# Patient Record
Sex: MALE | Race: WHITE | NOT HISPANIC OR LATINO | Employment: OTHER | URBAN - METROPOLITAN AREA
[De-identification: names, ages, dates, MRNs, and addresses within clinical notes are randomized per-mention and may not be internally consistent; named-entity substitution may affect disease eponyms.]

---

## 2023-04-04 LAB
ALANINE AMINOTRANSFERASE (SGPT) (U/L) IN SER/PLAS: 28 U/L (ref 10–52)
ALBUMIN (G/DL) IN SER/PLAS: 4.2 G/DL (ref 3.4–5)
ALKALINE PHOSPHATASE (U/L) IN SER/PLAS: 90 U/L (ref 33–136)
ANION GAP IN SER/PLAS: 15 MMOL/L (ref 10–20)
ASPARTATE AMINOTRANSFERASE (SGOT) (U/L) IN SER/PLAS: 26 U/L (ref 9–39)
BILIRUBIN TOTAL (MG/DL) IN SER/PLAS: 1.1 MG/DL (ref 0–1.2)
CALCIUM (MG/DL) IN SER/PLAS: 9.7 MG/DL (ref 8.6–10.6)
CARBON DIOXIDE, TOTAL (MMOL/L) IN SER/PLAS: 27 MMOL/L (ref 21–32)
CHLORIDE (MMOL/L) IN SER/PLAS: 105 MMOL/L (ref 98–107)
CHOLESTEROL (MG/DL) IN SER/PLAS: 133 MG/DL (ref 0–199)
CHOLESTEROL IN HDL (MG/DL) IN SER/PLAS: 51.9 MG/DL
CHOLESTEROL/HDL RATIO: 2.6
CREATININE (MG/DL) IN SER/PLAS: 1.14 MG/DL (ref 0.5–1.3)
ERYTHROCYTE DISTRIBUTION WIDTH (RATIO) BY AUTOMATED COUNT: 14.8 % (ref 11.5–14.5)
ERYTHROCYTE MEAN CORPUSCULAR HEMOGLOBIN CONCENTRATION (G/DL) BY AUTOMATED: 31.3 G/DL (ref 32–36)
ERYTHROCYTE MEAN CORPUSCULAR VOLUME (FL) BY AUTOMATED COUNT: 85 FL (ref 80–100)
ERYTHROCYTES (10*6/UL) IN BLOOD BY AUTOMATED COUNT: 4.94 X10E12/L (ref 4.5–5.9)
GFR MALE: 65 ML/MIN/1.73M2
GLUCOSE (MG/DL) IN SER/PLAS: 76 MG/DL (ref 74–99)
HEMATOCRIT (%) IN BLOOD BY AUTOMATED COUNT: 41.9 % (ref 41–52)
HEMOGLOBIN (G/DL) IN BLOOD: 13.1 G/DL (ref 13.5–17.5)
LDL: 68 MG/DL (ref 0–99)
LEUKOCYTES (10*3/UL) IN BLOOD BY AUTOMATED COUNT: 11.4 X10E9/L (ref 4.4–11.3)
NRBC (PER 100 WBCS) BY AUTOMATED COUNT: 0 /100 WBC (ref 0–0)
PLATELETS (10*3/UL) IN BLOOD AUTOMATED COUNT: 317 X10E9/L (ref 150–450)
POTASSIUM (MMOL/L) IN SER/PLAS: 4.9 MMOL/L (ref 3.5–5.3)
PROSTATE SPECIFIC AG (NG/ML) IN SER/PLAS: 2.24 NG/ML (ref 0–4)
PROTEIN TOTAL: 7.1 G/DL (ref 6.4–8.2)
SODIUM (MMOL/L) IN SER/PLAS: 142 MMOL/L (ref 136–145)
THYROTROPIN (MIU/L) IN SER/PLAS BY DETECTION LIMIT <= 0.05 MIU/L: 0.91 MIU/L (ref 0.44–3.98)
TRIGLYCERIDE (MG/DL) IN SER/PLAS: 66 MG/DL (ref 0–149)
UREA NITROGEN (MG/DL) IN SER/PLAS: 24 MG/DL (ref 6–23)
VLDL: 13 MG/DL (ref 0–40)

## 2023-08-21 PROBLEM — I48.91 ATRIAL FIBRILLATION (MULTI): Status: ACTIVE | Noted: 2023-08-21

## 2023-08-21 PROBLEM — J31.0 CHRONIC RHINITIS: Status: ACTIVE | Noted: 2023-08-21

## 2023-08-21 PROBLEM — F32.A DEPRESSION: Status: ACTIVE | Noted: 2023-08-21

## 2023-08-21 PROBLEM — N32.3 BLADDER DIVERTICULUM: Status: ACTIVE | Noted: 2023-08-21

## 2023-08-21 PROBLEM — R97.20 BPH WITH ELEVATED PSA: Status: ACTIVE | Noted: 2023-08-21

## 2023-08-21 PROBLEM — E55.9 VITAMIN D DEFICIENCY: Status: ACTIVE | Noted: 2023-08-21

## 2023-08-21 PROBLEM — R97.20 ELEVATED PSA: Status: ACTIVE | Noted: 2023-08-21

## 2023-08-21 PROBLEM — R41.3 MEMORY CHANGES: Status: ACTIVE | Noted: 2023-08-21

## 2023-08-21 PROBLEM — E78.5 HYPERLIPIDEMIA: Status: ACTIVE | Noted: 2023-08-21

## 2023-08-21 PROBLEM — R49.0 HOARSENESS: Status: ACTIVE | Noted: 2023-08-21

## 2023-08-21 PROBLEM — R53.83 FATIGUE: Status: ACTIVE | Noted: 2023-08-21

## 2023-08-21 PROBLEM — N39.0 UTI (URINARY TRACT INFECTION): Status: ACTIVE | Noted: 2023-08-21

## 2023-08-21 PROBLEM — I25.10 CAD (CORONARY ARTERY DISEASE): Status: ACTIVE | Noted: 2023-08-21

## 2023-08-21 PROBLEM — H90.3 SENSORINEURAL HEARING LOSS (SNHL) OF BOTH EARS: Status: ACTIVE | Noted: 2023-08-21

## 2023-08-21 PROBLEM — J34.2 DEVIATED NASAL SEPTUM: Status: ACTIVE | Noted: 2023-08-21

## 2023-08-21 PROBLEM — R63.4 ABNORMAL WEIGHT LOSS: Status: ACTIVE | Noted: 2023-08-21

## 2023-08-21 PROBLEM — H91.90 HEARING LOSS: Status: ACTIVE | Noted: 2023-08-21

## 2023-08-21 PROBLEM — T50.905A MEDICATION INDUCED COAGULOPATHY (MULTI): Status: ACTIVE | Noted: 2023-08-21

## 2023-08-21 PROBLEM — I10 HYPERTENSION: Status: ACTIVE | Noted: 2023-08-21

## 2023-08-21 PROBLEM — H57.9 EYE DISORDER: Status: ACTIVE | Noted: 2023-08-21

## 2023-08-21 PROBLEM — K21.9 ACID REFLUX: Status: ACTIVE | Noted: 2023-08-21

## 2023-08-21 PROBLEM — R04.0 EPISTAXIS: Status: ACTIVE | Noted: 2023-08-21

## 2023-08-21 PROBLEM — D68.9 MEDICATION INDUCED COAGULOPATHY (MULTI): Status: ACTIVE | Noted: 2023-08-21

## 2023-08-21 PROBLEM — N40.0 BPH WITH ELEVATED PSA: Status: ACTIVE | Noted: 2023-08-21

## 2023-08-21 RX ORDER — DORZOLAMIDE HYDROCHLORIDE AND TIMOLOL MALEATE 20; 5 MG/ML; MG/ML
1 SOLUTION/ DROPS OPHTHALMIC 4 TIMES DAILY
COMMUNITY
Start: 2023-04-03

## 2023-08-21 RX ORDER — LISINOPRIL 10 MG/1
1 TABLET ORAL DAILY
COMMUNITY
Start: 2015-05-26 | End: 2024-01-12

## 2023-08-21 RX ORDER — WARFARIN 3 MG/1
1 TABLET ORAL DAILY
COMMUNITY
Start: 2021-02-16 | End: 2023-11-06

## 2023-08-21 RX ORDER — ASPIRIN 81 MG/1
1 TABLET ORAL DAILY
COMMUNITY
Start: 2014-06-13 | End: 2024-02-16 | Stop reason: SDUPTHER

## 2023-08-21 RX ORDER — EZETIMIBE 10 MG/1
1 TABLET ORAL DAILY
COMMUNITY
Start: 2019-11-20 | End: 2024-02-15 | Stop reason: SDUPTHER

## 2023-08-21 RX ORDER — METOPROLOL SUCCINATE 25 MG/1
1 TABLET, EXTENDED RELEASE ORAL DAILY
COMMUNITY
Start: 2015-05-26 | End: 2024-02-15 | Stop reason: SDUPTHER

## 2023-08-21 RX ORDER — OFLOXACIN 3 MG/ML
SOLUTION/ DROPS OPHTHALMIC
COMMUNITY
Start: 2016-04-01 | End: 2023-12-29

## 2023-08-21 RX ORDER — PANTOPRAZOLE SODIUM 20 MG/1
1 TABLET, DELAYED RELEASE ORAL DAILY
COMMUNITY
Start: 2014-07-14 | End: 2024-01-05 | Stop reason: ALTCHOICE

## 2023-08-21 RX ORDER — ATORVASTATIN CALCIUM 80 MG/1
1 TABLET, FILM COATED ORAL DAILY
COMMUNITY
Start: 2018-02-19 | End: 2024-02-15 | Stop reason: SDUPTHER

## 2023-09-28 DIAGNOSIS — Z79.01 LONG TERM (CURRENT) USE OF ANTICOAGULANTS: ICD-10-CM

## 2023-09-28 DIAGNOSIS — I48.91 ATRIAL FIBRILLATION, UNSPECIFIED TYPE (MULTI): Primary | ICD-10-CM

## 2023-09-28 LAB
INR IN PPP BY COAGULATION ASSAY EXTERNAL: 2.6
PROTHROMBIN TIME (PT) IN PPP BY COAGULATION ASSAY EXTERNAL: NORMAL SECONDS

## 2023-10-02 ENCOUNTER — APPOINTMENT (OUTPATIENT)
Dept: CARDIOLOGY | Facility: CLINIC | Age: 83
End: 2023-10-02
Payer: MEDICARE

## 2023-10-17 PROBLEM — Z79.01 LONG TERM (CURRENT) USE OF ANTICOAGULANTS: Status: ACTIVE | Noted: 2023-10-17

## 2023-10-26 ENCOUNTER — ANTICOAGULATION - WARFARIN VISIT (OUTPATIENT)
Dept: CARDIOLOGY | Facility: CLINIC | Age: 83
End: 2023-10-26
Payer: MEDICARE

## 2023-10-26 DIAGNOSIS — I48.91 ATRIAL FIBRILLATION, UNSPECIFIED TYPE (MULTI): ICD-10-CM

## 2023-10-26 DIAGNOSIS — Z79.01 LONG TERM (CURRENT) USE OF ANTICOAGULANTS: ICD-10-CM

## 2023-10-26 LAB
POC INR: 3.4
POC PROTHROMBIN TIME: NORMAL

## 2023-10-26 PROCEDURE — 99211 OFF/OP EST MAY X REQ PHY/QHP: CPT | Performed by: INTERNAL MEDICINE

## 2023-10-26 PROCEDURE — 85610 PROTHROMBIN TIME: CPT | Mod: QW

## 2023-10-26 NOTE — PROGRESS NOTES
Patient identification verified with 2 identifiers.    Location: [facility] (Building [address]) - [address] [address] [place] [address] [phone]     Referring Physician: Nick  Enrollment/ Re-enrollment date: 09/2024   INR Goal: 2.0-3.0  INR monitoring is per [facility] protocol.  Anticoagulation Medication: warfarin  Indication: Atrial Fibrillation/Atrial Flutter    Subjective   Bleeding signs/symptoms: No    Bruising: No   Major bleeding event: No  Thrombosis signs/symptoms: No  Thromboembolic event: No  Missed doses: No  Extra doses: No  Medication changes: No  Dietary changes: Yes Decrease in Vitamin K  Change in health: No  Change in activity: No  Alcohol: No  Other concerns: No    Upcoming Surgeries:  Does the Patient Have any upcoming surgeries that require interruption in anticoagulation therapy? no  Does the patient require bridging? no      Anticoagulation Summary  As of 10/26/2023      INR goal:  2.0-3.0   TTR:  24.3 % (2.6 wk)   INR used for dosing:  3.40 (10/26/2023)   Weekly warfarin total:  21 mg               Assessment/Plan   Supratherapeutic     1. New dose: no change    2. Next INR: 1 week      Education provided to patient during the visit:  Patient instructed to call in interim with questions, concerns and changes.   Patient educated on dietary consistency in vitamin k consumption.

## 2023-11-02 ENCOUNTER — APPOINTMENT (OUTPATIENT)
Dept: CARDIOLOGY | Facility: CLINIC | Age: 83
End: 2023-11-02
Payer: MEDICARE

## 2023-11-05 DIAGNOSIS — I48.91 ATRIAL FIBRILLATION, UNSPECIFIED TYPE (MULTI): Primary | ICD-10-CM

## 2023-11-06 RX ORDER — WARFARIN 3 MG/1
3 TABLET ORAL
Qty: 100 TABLET | Refills: 2 | Status: SHIPPED | OUTPATIENT
Start: 2023-11-06 | End: 2024-02-15 | Stop reason: SDUPTHER

## 2023-11-08 ENCOUNTER — ANTICOAGULATION - WARFARIN VISIT (OUTPATIENT)
Dept: CARDIOLOGY | Facility: CLINIC | Age: 83
End: 2023-11-08
Payer: MEDICARE

## 2023-11-08 DIAGNOSIS — I48.91 ATRIAL FIBRILLATION, UNSPECIFIED TYPE (MULTI): ICD-10-CM

## 2023-11-08 DIAGNOSIS — Z79.01 LONG TERM (CURRENT) USE OF ANTICOAGULANTS: ICD-10-CM

## 2023-11-09 ENCOUNTER — ANTICOAGULATION - WARFARIN VISIT (OUTPATIENT)
Dept: CARDIOLOGY | Facility: CLINIC | Age: 83
End: 2023-11-09
Payer: MEDICARE

## 2023-11-09 ENCOUNTER — APPOINTMENT (OUTPATIENT)
Dept: CARDIOLOGY | Facility: CLINIC | Age: 83
End: 2023-11-09
Payer: MEDICARE

## 2023-11-09 ENCOUNTER — TELEPHONE (OUTPATIENT)
Dept: CARDIOLOGY | Facility: CLINIC | Age: 83
End: 2023-11-09
Payer: MEDICARE

## 2023-11-09 DIAGNOSIS — Z79.01 LONG TERM (CURRENT) USE OF ANTICOAGULANTS: ICD-10-CM

## 2023-11-09 DIAGNOSIS — I48.91 ATRIAL FIBRILLATION, UNSPECIFIED TYPE (MULTI): ICD-10-CM

## 2023-11-15 ENCOUNTER — TELEPHONE (OUTPATIENT)
Dept: CARDIOLOGY | Facility: CLINIC | Age: 83
End: 2023-11-15
Payer: MEDICARE

## 2023-11-15 NOTE — TELEPHONE ENCOUNTER
PATIENT DID NOT SHOW FOR APT, ATTEMPTED TO CALL TO RESCHEDULE BUT #'S ARE DISCONNECTED OR INVALID.

## 2023-12-04 ENCOUNTER — TELEPHONE (OUTPATIENT)
Dept: PRIMARY CARE | Facility: CLINIC | Age: 83
End: 2023-12-04
Payer: MEDICARE

## 2023-12-07 ENCOUNTER — ANTICOAGULATION - WARFARIN VISIT (OUTPATIENT)
Dept: CARDIOLOGY | Facility: CLINIC | Age: 83
End: 2023-12-07
Payer: MEDICARE

## 2023-12-07 DIAGNOSIS — Z79.01 LONG TERM (CURRENT) USE OF ANTICOAGULANTS: ICD-10-CM

## 2023-12-07 DIAGNOSIS — I48.91 ATRIAL FIBRILLATION, UNSPECIFIED TYPE (MULTI): ICD-10-CM

## 2023-12-07 LAB
POC INR: 2.3
POC PROTHROMBIN TIME: NORMAL

## 2023-12-07 PROCEDURE — 85610 PROTHROMBIN TIME: CPT | Mod: QW

## 2023-12-07 PROCEDURE — 99211 OFF/OP EST MAY X REQ PHY/QHP: CPT | Mod: 27 | Performed by: INTERNAL MEDICINE

## 2023-12-07 NOTE — PROGRESS NOTES
Patient identification verified with 2 identifiers.    Location: [facility] ([address]) - [address] [place] [address] [phone]     Referring Physician: Dr. Sae Romero  Enrollment/ Re-enrollment date: 2024   INR Goal: 2.0-3.0  INR monitoring is per [facility] protocol.  Anticoagulation Medication: warfarin  Indication: Atrial Fibrillation/Atrial Flutter    Subjective   Bleeding signs/symptoms: No    Bruising: No   Major bleeding event: No  Thrombosis signs/symptoms: No  Thromboembolic event: No  Missed doses: No  Extra doses: No  Medication changes: No  Dietary changes: Yes Decrease in Vitamin K  Change in health: Yes  Change in activity: No  Alcohol: No  Other concerns: No    Upcoming Surgeries:  Does the Patient Have any upcoming surgeries that require interruption in anticoagulation therapy? no  Does the patient require bridging? no      Anticoagulation Summary  As of 2023      INR goal:  2.0-3.0   TTR:  51.6 % (2 mo)   INR used for dosin.30 (2023)   Weekly warfarin total:  21 mg               Assessment/Plan   Therapeutic    1. New dose:   no change    2. Next INR: 1 month      Education provided to patient during the visit:  Patient instructed to call in interim with questions, concerns and changes.   Patient educated on dietary consistency in vitamin k consumption.

## 2023-12-22 ENCOUNTER — HOSPITAL ENCOUNTER (OUTPATIENT)
Dept: CARDIOLOGY | Facility: CLINIC | Age: 83
Discharge: HOME | End: 2023-12-22
Payer: MEDICARE

## 2023-12-22 VITALS
DIASTOLIC BLOOD PRESSURE: 77 MMHG | HEIGHT: 65 IN | SYSTOLIC BLOOD PRESSURE: 134 MMHG | BODY MASS INDEX: 20.66 KG/M2 | WEIGHT: 124 LBS

## 2023-12-22 DIAGNOSIS — I25.10 CAD (CORONARY ARTERY DISEASE): ICD-10-CM

## 2023-12-22 DIAGNOSIS — I48.91 A-FIB (MULTI): ICD-10-CM

## 2023-12-22 PROCEDURE — 93306 TTE W/DOPPLER COMPLETE: CPT

## 2023-12-22 PROCEDURE — 93306 TTE W/DOPPLER COMPLETE: CPT | Performed by: INTERNAL MEDICINE

## 2023-12-26 LAB
AORTIC VALVE MEAN GRADIENT: 14.6
AORTIC VALVE PEAK VELOCITY: 2.89
AV PEAK GRADIENT: 33.3
AVA (PEAK VEL): 1.45
AVA (VTI): 1.73
EJECTION FRACTION APICAL 4 CHAMBER: 62.5
EJECTION FRACTION: 68
LEFT ATRIUM VOLUME AREA LENGTH INDEX BSA: 34.5
LEFT VENTRICLE INTERNAL DIMENSION DIASTOLE: 3.36 (ref 3.5–6)
LEFT VENTRICULAR OUTFLOW TRACT DIAMETER: 2.23
MITRAL VALVE E/A RATIO: 1.04
MITRAL VALVE E/E' RATIO: 10.61
RIGHT VENTRICLE FREE WALL PEAK S': 11.04
RIGHT VENTRICLE PEAK SYSTOLIC PRESSURE: 36.7
TRICUSPID ANNULAR PLANE SYSTOLIC EXCURSION: 2.4

## 2023-12-29 ENCOUNTER — OFFICE VISIT (OUTPATIENT)
Dept: CARDIOLOGY | Facility: CLINIC | Age: 83
End: 2023-12-29
Payer: MEDICARE

## 2023-12-29 VITALS
OXYGEN SATURATION: 97 % | SYSTOLIC BLOOD PRESSURE: 136 MMHG | WEIGHT: 121.6 LBS | HEART RATE: 53 BPM | DIASTOLIC BLOOD PRESSURE: 79 MMHG | BODY MASS INDEX: 18.01 KG/M2 | HEIGHT: 69 IN

## 2023-12-29 DIAGNOSIS — I25.10 CORONARY ARTERY DISEASE INVOLVING NATIVE CORONARY ARTERY OF NATIVE HEART WITHOUT ANGINA PECTORIS: Primary | ICD-10-CM

## 2023-12-29 PROBLEM — N40.0 BENIGN PROSTATIC HYPERPLASIA: Status: ACTIVE | Noted: 2023-04-03

## 2023-12-29 PROBLEM — R31.0 GROSS HEMATURIA: Status: ACTIVE | Noted: 2023-12-29

## 2023-12-29 PROBLEM — Z86.79 HISTORY OF HYPERTENSION: Status: ACTIVE | Noted: 2023-12-29

## 2023-12-29 PROCEDURE — 99214 OFFICE O/P EST MOD 30 MIN: CPT | Performed by: NURSE PRACTITIONER

## 2023-12-29 PROCEDURE — 3078F DIAST BP <80 MM HG: CPT | Performed by: NURSE PRACTITIONER

## 2023-12-29 PROCEDURE — 1160F RVW MEDS BY RX/DR IN RCRD: CPT | Performed by: NURSE PRACTITIONER

## 2023-12-29 PROCEDURE — 1126F AMNT PAIN NOTED NONE PRSNT: CPT | Performed by: NURSE PRACTITIONER

## 2023-12-29 PROCEDURE — 3075F SYST BP GE 130 - 139MM HG: CPT | Performed by: NURSE PRACTITIONER

## 2023-12-29 PROCEDURE — 1036F TOBACCO NON-USER: CPT | Performed by: NURSE PRACTITIONER

## 2023-12-29 PROCEDURE — 1159F MED LIST DOCD IN RCRD: CPT | Performed by: NURSE PRACTITIONER

## 2023-12-29 RX ORDER — MIRTAZAPINE 15 MG/1
TABLET, FILM COATED ORAL
COMMUNITY
Start: 2020-01-14 | End: 2023-12-29 | Stop reason: WASHOUT

## 2023-12-29 RX ORDER — PREDNISOLONE ACETATE 10 MG/ML
SUSPENSION/ DROPS OPHTHALMIC
COMMUNITY
Start: 2016-04-01 | End: 2023-12-29 | Stop reason: WASHOUT

## 2023-12-29 ASSESSMENT — ENCOUNTER SYMPTOMS
LOSS OF SENSATION IN FEET: 0
MUSCULOSKELETAL NEGATIVE: 1
GASTROINTESTINAL NEGATIVE: 1
CONSTITUTIONAL NEGATIVE: 1
RESPIRATORY NEGATIVE: 1
DEPRESSION: 0
CARDIOVASCULAR NEGATIVE: 1
OCCASIONAL FEELINGS OF UNSTEADINESS: 0
NEUROLOGICAL NEGATIVE: 1

## 2023-12-29 ASSESSMENT — PAIN SCALES - GENERAL: PAINLEVEL: 0-NO PAIN

## 2023-12-29 NOTE — PROGRESS NOTES
"Chief Complaint:   Follow up    History Of Present Illness:    .Mr Farley returns in follow up.  Denies chest pain, sob, palpitations or pedal edema.           Last Recorded Vitals:  Blood pressure 136/79, pulse 53, height 1.753 m (5' 9\"), weight 55.2 kg (121 lb 9.6 oz), SpO2 97 %.     Past Medical History:  Past Medical History:   Diagnosis Date    Atherosclerotic heart disease of native coronary artery with unspecified angina pectoris (CMS/HCC)     Coronary artery disease involving native heart with angina pectoris, unspecified vessel or lesion type    Personal history of other diseases of the circulatory system     History of hypertension        Past Surgical History:  Past Surgical History:   Procedure Laterality Date    OTHER SURGICAL HISTORY  04/07/2015    Cath Stent 1 Location       Social History:  Social History     Socioeconomic History    Marital status:      Spouse name: None    Number of children: None    Years of education: None    Highest education level: None   Occupational History    None   Tobacco Use    Smoking status: Never    Smokeless tobacco: Never   Substance and Sexual Activity    Alcohol use: Never    Drug use: Never    Sexual activity: None   Other Topics Concern    None   Social History Narrative    None     Social Determinants of Health     Financial Resource Strain: Not on file   Food Insecurity: Not on file   Transportation Needs: Not on file   Physical Activity: Not on file   Stress: Not on file   Social Connections: Not on file   Intimate Partner Violence: Not on file   Housing Stability: Not on file       Family History:  Family History   Problem Relation Name Age of Onset    Hypertension Mother      Aneurysm Father           Allergies:  Patient has no known allergies.    Outpatient Medications:  Current Outpatient Medications   Medication Sig Dispense Refill    aspirin 81 mg EC tablet Take 1 tablet (81 mg) by mouth once daily.      atorvastatin (Lipitor) 80 mg tablet Take 1 " tablet (80 mg) by mouth once daily.      dorzolamide-timoloL (Cosopt) 22.3-6.8 mg/mL ophthalmic solution Administer 1 drop into affected eye(s) 4 times a day.      ezetimibe (Zetia) 10 mg tablet Take 1 tablet (10 mg) by mouth once daily.      lisinopril 10 mg tablet Take 1 tablet (10 mg) by mouth once daily.      metoprolol succinate XL (Toprol-XL) 25 mg 24 hr tablet Take 1 tablet (25 mg) by mouth once daily.      pantoprazole (ProtoNix) 20 mg EC tablet Take 1 tablet (20 mg) by mouth once daily.      warfarin (Coumadin) 3 mg tablet TAKE 1 TABLET BY MOUTH DAILY 100 tablet 2     No current facility-administered medications for this visit.        Physical Exam:  Cardiovascular:      PMI at left midclavicular line. Normal rate. Regular rhythm. Normal S1. Normal S2.       Murmurs: There is no murmur.      No gallop.  No click. No rub.   Pulses:     Intact distal pulses.   Edema:     Peripheral edema absent.         ROS:  Review of Systems   Constitutional: Negative.   Cardiovascular: Negative.    Respiratory: Negative.     Skin: Negative.    Musculoskeletal: Negative.    Gastrointestinal: Negative.    Genitourinary: Negative.    Neurological: Negative.           Last Labs:  CBC -  Lab Results   Component Value Date    WBC 11.4 (H) 04/03/2023    HGB 13.1 (L) 04/03/2023    HCT 41.9 04/03/2023    MCV 85 04/03/2023     04/03/2023       CMP -  Lab Results   Component Value Date    CALCIUM 9.7 04/03/2023    PHOS 3.0 01/23/2020    PROT 7.1 04/03/2023    ALBUMIN 4.2 04/03/2023    AST 26 04/03/2023    ALT 28 04/03/2023    ALKPHOS 90 04/03/2023    BILITOT 1.1 04/03/2023       LIPID PANEL -   Lab Results   Component Value Date    CHOL 133 04/03/2023    TRIG 66 04/03/2023    HDL 51.9 04/03/2023    CHHDL 2.6 04/03/2023    LDLF 68 04/03/2023    VLDL 13 04/03/2023       RENAL FUNCTION PANEL -   Lab Results   Component Value Date    GLUCOSE 76 04/03/2023     04/03/2023    K 4.9 04/03/2023     04/03/2023    CO2 27  04/03/2023    ANIONGAP 15 04/03/2023    BUN 24 (H) 04/03/2023    CREATININE 1.14 04/03/2023    GFRMALE 65 04/03/2023    CALCIUM 9.7 04/03/2023    PHOS 3.0 01/23/2020    ALBUMIN 4.2 04/03/2023        Lab Results   Component Value Date    HGBA1C 5.3 11/18/2019         Assessment/Plan   Problem List Items Addressed This Visit    None    Impressions     assessment:     1. Coronary artery disease with percutaneous coronary intervention, and stent to high-grade left anterior descending stenosis December 7, 2004. He did have a stress echo on February 2005, which was negative for ischemia. The patient more recently had a surveillance nuclear exercise treadmill test performed on 6/19/2015. EKG tracings were concerning for the presence of ischemia with downsloping ST segment depression in the inferolateral and anterior leads. The nuclear perfusion scan was surprisingly negative for any evidence of ischemia. Based on EKG findings however cardiac catheterization was advised and performed at [facility] on 6/23/2015. Demonstrated a moderate segmental 50% tubular narrowing of the late proximal LAD with a widely patent stent within the midportion of the LAD. The large essentially dominant LCx was free of obstructive disease. The small to medium-sized RCA exhibited a complete 100% mid vessel occlusion with minimal retrograde filling of the distal vessel from LAD septal perforators. The left ventriculogram showed preserved LV function. The study was reviewed by interventional cardiology and they PCI was not performed given the absence of an anatomically critical lesion, the absence of any anginal symptoms, and the absence of a perfusion abnormality on the nuclear perfusion scan of the stress test. He will however be monitored closely for disease progression in the LAD that might necessitate another PCI. Patient denies any chest pain at today's office visit.    Echo 12/2023  CONCLUSIONS:   1. Left ventricular systolic  function is normal with a 60-65% estimated ejection fraction.   2. Trace mitral valve regurgitation.   3. Slightly elevated RVSP.   4. Moderate aortic valve stenosis.   5. The aortic valve appears tricuspid with restriction.   6. There is moderate sclerotic calcification and reduced mobility of the right and noncoronary aortic valve cusps.   7. The estimated PASP is 37 mmHg.   8. The peak instantaneous gradient of the aortic valve is 33.3 mmHg.    2. Chronic atrial fibrillation. Patient had a zio patch on 5/20/2016 for one week this showed the predominant rhythm was a sinus rhythm with a heart rate of  bpm with an average heart rate of 70 bpm. He had one episode of ventricular tachycardia lasting five beats. Me was restarted on Metoprolol ER 25 mg daily as well as the digoxin 0.125 mg daily. EKG today to 19 2018 again confirms reversion back to atrial fibrillation which is asymptomatic and chronic. The ventricular rate is adequately controlled with low-dose metoprolol ER 25 mg daily which will be kept unchanged. The patient is asymptomatic without any palpitations although he is unsure as to whether or not he is aware of them even at the time of atrial fibrillation.   3. Coumadin anticoagulation. The patient will continue following up with the [facility]. We did discuss alternate anticoagulation at today's office visit.   4. Hyperlipidemia. The patient did have a lipid panel performed on 10/05/2016 including cholesterol 144 LDL 77 HDL 49 triglyceride 86. In order to improve his results she will be switched from simvastatin 80 mg daily to atorvastatin 80 mg daily.  5. Hypertension. Blood pressure is well-controlled at today's office visit. The Metoprolol ER 25 mg daily and Lisinopril 10 mg will be continued.      Edna Araujo, APRN-CNP

## 2024-01-04 ENCOUNTER — ANTICOAGULATION - WARFARIN VISIT (OUTPATIENT)
Dept: CARDIOLOGY | Facility: CLINIC | Age: 84
End: 2024-01-04
Payer: MEDICARE

## 2024-01-04 DIAGNOSIS — I48.91 ATRIAL FIBRILLATION, UNSPECIFIED TYPE (MULTI): ICD-10-CM

## 2024-01-04 DIAGNOSIS — Z79.01 LONG TERM (CURRENT) USE OF ANTICOAGULANTS: ICD-10-CM

## 2024-01-04 LAB
POC INR: 2
POC PROTHROMBIN TIME: NORMAL

## 2024-01-04 PROCEDURE — 99211 OFF/OP EST MAY X REQ PHY/QHP: CPT | Performed by: INTERNAL MEDICINE

## 2024-01-04 PROCEDURE — 85610 PROTHROMBIN TIME: CPT | Mod: QW

## 2024-01-04 NOTE — PROGRESS NOTES
Patient identification verified with 2 identifiers.    Location: [facility] (Building [address]) - [address] [address] [place] [address] [phone]     Referring Physician: Dr. Sae Romero  Enrollment/ Re-enrollment date: 2024   INR Goal: 2.0-3.0  INR monitoring is per [facility] protocol.  Anticoagulation Medication: warfarin  Indication: Atrial Fibrillation/Atrial Flutter    Subjective   Bleeding signs/symptoms: No    Bruising: No   Major bleeding event: No  Thrombosis signs/symptoms: No  Thromboembolic event: No  Missed doses: No  Extra doses: No  Medication changes: No  Dietary changes: Yes Decrease in Vitamin K  Change in health: Yes  Change in activity: No  Alcohol: No  Other concerns: No    Upcoming Surgeries:  Does the Patient Have any upcoming surgeries that require interruption in anticoagulation therapy? no  Does the patient require bridging? no      Anticoagulation Summary  As of 2024      INR goal:  2.0-3.0   TTR:  66.9 % (2.9 mo)   INR used for dosin.00 (2024)   Weekly warfarin total:  21 mg               Assessment/Plan   Therapeutic    1. New dose:   no change    2. Next INR: 1 month      Education provided to patient during the visit:  Patient instructed to call in interim with questions, concerns and changes.   Patient educated on dietary consistency in vitamin k consumption.

## 2024-01-05 ENCOUNTER — OFFICE VISIT (OUTPATIENT)
Dept: PRIMARY CARE | Facility: CLINIC | Age: 84
End: 2024-01-05
Payer: MEDICARE

## 2024-01-05 VITALS
RESPIRATION RATE: 16 BRPM | TEMPERATURE: 98.2 F | WEIGHT: 121 LBS | HEIGHT: 65 IN | SYSTOLIC BLOOD PRESSURE: 123 MMHG | BODY MASS INDEX: 20.16 KG/M2 | OXYGEN SATURATION: 97 % | DIASTOLIC BLOOD PRESSURE: 75 MMHG | HEART RATE: 64 BPM

## 2024-01-05 DIAGNOSIS — E55.9 VITAMIN D DEFICIENCY: ICD-10-CM

## 2024-01-05 DIAGNOSIS — H90.3 SENSORINEURAL HEARING LOSS (SNHL) OF BOTH EARS: ICD-10-CM

## 2024-01-05 DIAGNOSIS — R97.20 ELEVATED PSA: ICD-10-CM

## 2024-01-05 DIAGNOSIS — K21.9 GASTROESOPHAGEAL REFLUX DISEASE WITHOUT ESOPHAGITIS: ICD-10-CM

## 2024-01-05 DIAGNOSIS — R53.83 OTHER FATIGUE: ICD-10-CM

## 2024-01-05 DIAGNOSIS — Z00.00 MEDICARE ANNUAL WELLNESS VISIT, SUBSEQUENT: Primary | ICD-10-CM

## 2024-01-05 DIAGNOSIS — G47.33 OSA (OBSTRUCTIVE SLEEP APNEA): ICD-10-CM

## 2024-01-05 DIAGNOSIS — J31.0 CHRONIC RHINITIS: ICD-10-CM

## 2024-01-05 DIAGNOSIS — R49.0 HOARSENESS: ICD-10-CM

## 2024-01-05 DIAGNOSIS — I10 HYPERTENSION, UNSPECIFIED TYPE: ICD-10-CM

## 2024-01-05 DIAGNOSIS — I35.0 NONRHEUMATIC AORTIC VALVE STENOSIS: ICD-10-CM

## 2024-01-05 DIAGNOSIS — I48.91 ATRIAL FIBRILLATION, UNSPECIFIED TYPE (MULTI): ICD-10-CM

## 2024-01-05 PROBLEM — R04.0 EPISTAXIS: Status: RESOLVED | Noted: 2023-08-21 | Resolved: 2024-01-05

## 2024-01-05 PROBLEM — Z86.79 HISTORY OF HYPERTENSION: Status: RESOLVED | Noted: 2023-12-29 | Resolved: 2024-01-05

## 2024-01-05 PROBLEM — R31.0 GROSS HEMATURIA: Status: RESOLVED | Noted: 2023-12-29 | Resolved: 2024-01-05

## 2024-01-05 PROBLEM — T50.905A MEDICATION INDUCED COAGULOPATHY (MULTI): Status: RESOLVED | Noted: 2023-08-21 | Resolved: 2024-01-05

## 2024-01-05 PROBLEM — D68.9 MEDICATION INDUCED COAGULOPATHY (MULTI): Status: RESOLVED | Noted: 2023-08-21 | Resolved: 2024-01-05

## 2024-01-05 PROBLEM — N39.0 UTI (URINARY TRACT INFECTION): Status: RESOLVED | Noted: 2023-08-21 | Resolved: 2024-01-05

## 2024-01-05 PROBLEM — F32.A DEPRESSION: Status: RESOLVED | Noted: 2023-08-21 | Resolved: 2024-01-05

## 2024-01-05 PROBLEM — R63.4 ABNORMAL WEIGHT LOSS: Status: RESOLVED | Noted: 2023-08-21 | Resolved: 2024-01-05

## 2024-01-05 PROCEDURE — 3078F DIAST BP <80 MM HG: CPT | Performed by: PEDIATRICS

## 2024-01-05 PROCEDURE — 1159F MED LIST DOCD IN RCRD: CPT | Performed by: PEDIATRICS

## 2024-01-05 PROCEDURE — 3074F SYST BP LT 130 MM HG: CPT | Performed by: PEDIATRICS

## 2024-01-05 PROCEDURE — 1036F TOBACCO NON-USER: CPT | Performed by: PEDIATRICS

## 2024-01-05 PROCEDURE — 99212 OFFICE O/P EST SF 10 MIN: CPT | Performed by: PEDIATRICS

## 2024-01-05 PROCEDURE — 1126F AMNT PAIN NOTED NONE PRSNT: CPT | Performed by: PEDIATRICS

## 2024-01-05 PROCEDURE — 1160F RVW MEDS BY RX/DR IN RCRD: CPT | Performed by: PEDIATRICS

## 2024-01-05 PROCEDURE — G0439 PPPS, SUBSEQ VISIT: HCPCS | Performed by: PEDIATRICS

## 2024-01-05 PROCEDURE — 1170F FXNL STATUS ASSESSED: CPT | Performed by: PEDIATRICS

## 2024-01-05 RX ORDER — FAMOTIDINE 40 MG/1
40 TABLET, FILM COATED ORAL DAILY
Qty: 90 TABLET | Refills: 1 | Status: SHIPPED | OUTPATIENT
Start: 2024-01-05 | End: 2024-02-16 | Stop reason: SDUPTHER

## 2024-01-05 ASSESSMENT — ACTIVITIES OF DAILY LIVING (ADL)
DRESSING: INDEPENDENT
BATHING: INDEPENDENT
MANAGING_FINANCES: INDEPENDENT
TAKING_MEDICATION: INDEPENDENT
DOING_HOUSEWORK: INDEPENDENT
GROCERY_SHOPPING: NEEDS ASSISTANCE

## 2024-01-05 ASSESSMENT — PATIENT HEALTH QUESTIONNAIRE - PHQ9
SUM OF ALL RESPONSES TO PHQ9 QUESTIONS 1 AND 2: 0
2. FEELING DOWN, DEPRESSED OR HOPELESS: NOT AT ALL
1. LITTLE INTEREST OR PLEASURE IN DOING THINGS: NOT AT ALL

## 2024-01-05 ASSESSMENT — PAIN SCALES - GENERAL: PAINLEVEL: 0-NO PAIN

## 2024-01-05 NOTE — PROGRESS NOTES
"Subjective   Reason for Visit: Silas Farley is an 83 y.o. male here for a Medicare Wellness visit.          Reviewed all medications by prescribing practitioner or clinical pharmacist (such as prescriptions, OTCs, herbal therapies and supplements) and documented in the medical record.    HPI  Patient has not been for quite some time. Sugar this year was good at 76. Kidney and liver functions were normal; hematocrit ok; PSA normal; cholesterol 133  Patient Care Team:  Jackie Saldivar MD as PCP - General (Pediatrics)     Review of Systems    Objective   Vitals:  /75 (BP Location: Right arm, Patient Position: Sitting, BP Cuff Size: Small adult)   Pulse 64   Temp 36.8 °C (98.2 °F) (Temporal)   Resp 16   Ht 1.651 m (5' 5\")   Wt 54.9 kg (121 lb)   SpO2 97%   BMI 20.14 kg/m²       Physical Exam  Vitals reviewed.   Constitutional:       General: He is not in acute distress.  HENT:      Head: Normocephalic.      Right Ear: Tympanic membrane normal.      Left Ear: Tympanic membrane normal.      Nose: Nose normal.      Mouth/Throat:      Pharynx: Oropharynx is clear.   Cardiovascular:      Rate and Rhythm: Normal rate and regular rhythm.      Heart sounds: Normal heart sounds.      Comments: Grade 2/6 murmur aortic area  Pulmonary:      Breath sounds: Normal breath sounds.   Abdominal:      Palpations: Abdomen is soft.      Tenderness: There is no abdominal tenderness.   Musculoskeletal:         General: No tenderness.   Skin:     Findings: No rash.   Neurological:      General: No focal deficit present.      Mental Status: He is alert.   Psychiatric:         Mood and Affect: Mood normal.     MMSE 29/30    Assessment/Plan   Problem List Items Addressed This Visit       Gastroesophageal reflux disease    Current Assessment & Plan     Notices that if he misses Pantoprazole for a couple of days he feels heartburn. Willing to try Famotidine         Relevant Medications    famotidine (Pepcid) 40 mg tablet    Atrial " fibrillation (CMS/HCC)    Current Assessment & Plan     Saw Dr Romero; has chronic a fib; on anticoagulation and rate control         Chronic rhinitis    Current Assessment & Plan     Uses astelin as needed         RESOLVED: Elevated PSA    Current Assessment & Plan     Has not seen urology for 3 years.         Fatigue    Current Assessment & Plan     Has excessive daytime sleepiness         Hoarseness    Current Assessment & Plan     Was advised to use Mucinex and stay hydrated per ENT         Hypertension    Current Assessment & Plan     Doing well on lisinopril, metoprolol         Sensorineural hearing loss (SNHL) of both ears    Current Assessment & Plan     Was advised to get hearing test         Vitamin D deficiency    Current Assessment & Plan     Has not been taking vitamin d         CHELSEA (obstructive sleep apnea)    Relevant Orders    Positive Airway Pressure (PAP) Therapy    Aortic stenosis    Current Assessment & Plan     Moderate aortic stenosis per Echo          Other Visit Diagnoses       Medicare annual wellness visit, subsequent    -  Primary

## 2024-01-05 NOTE — PATIENT INSTRUCTIONS
Consider shingles vaccine, RSV, Covid and flu vaccines at pharmacy.  Call insurance company  to ask which company we should send CPAP order for your sleep apnea.  Return in 6 months

## 2024-01-05 NOTE — ASSESSMENT & PLAN NOTE
Notices that if he misses Pantoprazole for a couple of days he feels heartburn. Willing to try Famotidine

## 2024-01-11 DIAGNOSIS — I10 HYPERTENSION, UNSPECIFIED TYPE: Primary | ICD-10-CM

## 2024-01-12 RX ORDER — LISINOPRIL 10 MG/1
10 TABLET ORAL DAILY
Qty: 100 TABLET | Refills: 2 | Status: SHIPPED | OUTPATIENT
Start: 2024-01-12 | End: 2024-02-15 | Stop reason: SDUPTHER

## 2024-02-01 ENCOUNTER — ANTICOAGULATION - WARFARIN VISIT (OUTPATIENT)
Dept: CARDIOLOGY | Facility: CLINIC | Age: 84
End: 2024-02-01
Payer: MEDICARE

## 2024-02-01 DIAGNOSIS — Z79.01 LONG TERM (CURRENT) USE OF ANTICOAGULANTS: ICD-10-CM

## 2024-02-01 DIAGNOSIS — I48.91 ATRIAL FIBRILLATION, UNSPECIFIED TYPE (MULTI): ICD-10-CM

## 2024-02-01 LAB
POC INR: 1.6
POC PROTHROMBIN TIME: NORMAL

## 2024-02-01 PROCEDURE — 99211 OFF/OP EST MAY X REQ PHY/QHP: CPT | Performed by: INTERNAL MEDICINE

## 2024-02-01 PROCEDURE — 85610 PROTHROMBIN TIME: CPT | Mod: QW

## 2024-02-01 NOTE — PROGRESS NOTES
Patient identification verified with 2 identifiers.    Location: [facility] ([address]) - [address] [address] [place] [address] [phone]     Referring Physician: Dr. Sae Romero  Enrollment/ Re-enrollment date: 2024   INR Goal: 2.0-3.0  INR monitoring is per [facility] protocol.  Anticoagulation Medication: warfarin  Indication: Atrial Fibrillation/Atrial Flutter    Subjective   Bleeding signs/symptoms: No    Bruising: No   Major bleeding event: No  Thrombosis signs/symptoms: No  Thromboembolic event: No  Missed doses: Yes  Missed 2 doses  Extra doses: No  Medication changes: No  Dietary changes: No  Change in health: No  Change in activity: No  Alcohol: No  Other concerns: No    Upcoming Surgeries:  Does the Patient Have any upcoming surgeries that require interruption in anticoagulation therapy? no  Does the patient require bridging? no      Anticoagulation Summary  As of 2024      INR goal:  2.0-3.0   TTR:  50.8 % (3.9 mo)   INR used for dosin.60 (2024)   Weekly warfarin total:  24 mg               Assessment/Plan   SUBTherapeutic    1. New dose:  Increase dose  2. Next INR: 1 week      Education provided to patient during the visit:  Patient instructed to call in interim with questions, concerns and changes.   Patient educated on dietary consistency in vitamin k consumption.

## 2024-02-08 ENCOUNTER — ANTICOAGULATION - WARFARIN VISIT (OUTPATIENT)
Dept: CARDIOLOGY | Facility: CLINIC | Age: 84
End: 2024-02-08
Payer: MEDICARE

## 2024-02-08 DIAGNOSIS — I48.91 ATRIAL FIBRILLATION, UNSPECIFIED TYPE (MULTI): ICD-10-CM

## 2024-02-08 DIAGNOSIS — Z79.01 LONG TERM (CURRENT) USE OF ANTICOAGULANTS: ICD-10-CM

## 2024-02-08 LAB
POC INR: 2.2
POC PROTHROMBIN TIME: NORMAL

## 2024-02-08 PROCEDURE — 99211 OFF/OP EST MAY X REQ PHY/QHP: CPT | Performed by: INTERNAL MEDICINE

## 2024-02-08 PROCEDURE — 85610 PROTHROMBIN TIME: CPT | Mod: QW

## 2024-02-08 NOTE — PROGRESS NOTES
Patient identification verified with 2 identifiers.    Location: [facility] (Building [address]) - [address] [address] [place] [address] [phone]     Referring Physician: Dr. Sae Romero  Enrollment/ Re-enrollment date: 2024   INR Goal: 2.0-3.0  INR monitoring is per [facility] protocol.  Anticoagulation Medication: warfarin  Indication: Atrial Fibrillation/Atrial Flutter    Subjective   Bleeding signs/symptoms: No    Bruising: No   Major bleeding event: No  Thrombosis signs/symptoms: No  Thromboembolic event: No  Missed doses: No  Extra doses: No  Medication changes: No  Dietary changes: No  Change in health: No  Change in activity: No  Alcohol: No  Other concerns: No    Upcoming Surgeries:  Does the Patient Have any upcoming surgeries that require interruption in anticoagulation therapy? no  Does the patient require bridging? no      Anticoagulation Summary  As of 2024      INR goal:  2.0-3.0   TTR:  49.9 % (4.1 mo)   INR used for dosin.20 (2024)   Weekly warfarin total:  21 mg               Assessment/Plan   Therapeutic    1. New dose:  Maintain dose- Since taking previous therapeutic dose  2. Next INR: 2 weeks      Education provided to patient during the visit:  Patient instructed to call in interim with questions, concerns and changes.   Patient educated on dietary consistency in vitamin k consumption.

## 2024-02-15 DIAGNOSIS — I48.91 ATRIAL FIBRILLATION, UNSPECIFIED TYPE (MULTI): ICD-10-CM

## 2024-02-15 DIAGNOSIS — E78.5 HYPERLIPIDEMIA: Primary | ICD-10-CM

## 2024-02-15 DIAGNOSIS — I10 HYPERTENSION: ICD-10-CM

## 2024-02-16 ENCOUNTER — TELEPHONE (OUTPATIENT)
Dept: PRIMARY CARE | Facility: CLINIC | Age: 84
End: 2024-02-16
Payer: MEDICARE

## 2024-02-16 DIAGNOSIS — I10 HYPERTENSION: ICD-10-CM

## 2024-02-16 DIAGNOSIS — I48.91 ATRIAL FIBRILLATION, UNSPECIFIED TYPE (MULTI): ICD-10-CM

## 2024-02-16 DIAGNOSIS — K21.9 GASTROESOPHAGEAL REFLUX DISEASE WITHOUT ESOPHAGITIS: ICD-10-CM

## 2024-02-16 DIAGNOSIS — E78.5 HYPERLIPIDEMIA: ICD-10-CM

## 2024-02-16 RX ORDER — METOPROLOL SUCCINATE 25 MG/1
25 TABLET, EXTENDED RELEASE ORAL DAILY
Qty: 90 TABLET | Refills: 3 | Status: SHIPPED | OUTPATIENT
Start: 2024-02-16 | End: 2024-02-16 | Stop reason: SDUPTHER

## 2024-02-16 RX ORDER — EZETIMIBE 10 MG/1
10 TABLET ORAL DAILY
Qty: 90 TABLET | Refills: 3 | Status: SHIPPED | OUTPATIENT
Start: 2024-02-16 | End: 2025-02-15

## 2024-02-16 RX ORDER — LISINOPRIL 10 MG/1
10 TABLET ORAL DAILY
Qty: 90 TABLET | Refills: 3 | Status: SHIPPED | OUTPATIENT
Start: 2024-02-16 | End: 2024-02-16 | Stop reason: SDUPTHER

## 2024-02-16 RX ORDER — ATORVASTATIN CALCIUM 80 MG/1
80 TABLET, FILM COATED ORAL DAILY
Qty: 90 TABLET | Refills: 3 | Status: SHIPPED | OUTPATIENT
Start: 2024-02-16 | End: 2024-02-16 | Stop reason: SDUPTHER

## 2024-02-16 RX ORDER — LISINOPRIL 10 MG/1
10 TABLET ORAL DAILY
Qty: 90 TABLET | Refills: 3 | Status: SHIPPED | OUTPATIENT
Start: 2024-02-16 | End: 2025-02-15

## 2024-02-16 RX ORDER — ATORVASTATIN CALCIUM 80 MG/1
80 TABLET, FILM COATED ORAL DAILY
Qty: 90 TABLET | Refills: 3 | Status: SHIPPED | OUTPATIENT
Start: 2024-02-16 | End: 2025-02-15

## 2024-02-16 RX ORDER — WARFARIN 3 MG/1
3 TABLET ORAL SEE ADMIN INSTRUCTIONS
Qty: 100 TABLET | Refills: 3 | Status: SHIPPED | OUTPATIENT
Start: 2024-02-16 | End: 2024-02-26 | Stop reason: SDUPTHER

## 2024-02-16 RX ORDER — WARFARIN 3 MG/1
3 TABLET ORAL SEE ADMIN INSTRUCTIONS
Qty: 100 TABLET | Refills: 3 | Status: SHIPPED | OUTPATIENT
Start: 2024-02-16 | End: 2024-02-16 | Stop reason: SDUPTHER

## 2024-02-16 RX ORDER — METOPROLOL SUCCINATE 25 MG/1
25 TABLET, EXTENDED RELEASE ORAL DAILY
Qty: 90 TABLET | Refills: 3 | Status: SHIPPED | OUTPATIENT
Start: 2024-02-16 | End: 2025-02-15

## 2024-02-16 RX ORDER — EZETIMIBE 10 MG/1
10 TABLET ORAL DAILY
Qty: 90 TABLET | Refills: 3 | Status: SHIPPED | OUTPATIENT
Start: 2024-02-16 | End: 2024-02-16 | Stop reason: SDUPTHER

## 2024-02-16 RX ORDER — FAMOTIDINE 40 MG/1
40 TABLET, FILM COATED ORAL DAILY
Qty: 90 TABLET | Refills: 1 | Status: SHIPPED | OUTPATIENT
Start: 2024-02-16 | End: 2024-08-14

## 2024-02-16 RX ORDER — ASPIRIN 81 MG/1
81 TABLET ORAL DAILY
Qty: 90 TABLET | Refills: 3 | Status: SHIPPED | OUTPATIENT
Start: 2024-02-16

## 2024-02-16 NOTE — TELEPHONE ENCOUNTER
Dr. Janna Chase [facility] [employer] left a voicemail message saying  Silas Farley is new to [facility] and he would like all his medications sent to [facility] delivery mail order [phone]. Rena also said if you have questions to call Silas  at [phone].

## 2024-02-21 DIAGNOSIS — I48.91 ATRIAL FIBRILLATION, UNSPECIFIED TYPE (MULTI): ICD-10-CM

## 2024-02-22 RX ORDER — WARFARIN 3 MG/1
3 TABLET ORAL SEE ADMIN INSTRUCTIONS
Qty: 100 TABLET | Refills: 3 | OUTPATIENT
Start: 2024-02-22 | End: 2025-02-21

## 2024-02-26 ENCOUNTER — TELEPHONE (OUTPATIENT)
Dept: PRIMARY CARE | Facility: CLINIC | Age: 84
End: 2024-02-26
Payer: MEDICARE

## 2024-02-26 DIAGNOSIS — I48.91 ATRIAL FIBRILLATION, UNSPECIFIED TYPE (MULTI): ICD-10-CM

## 2024-02-26 RX ORDER — WARFARIN 3 MG/1
3 TABLET ORAL NIGHTLY
Qty: 100 TABLET | Refills: 3 | Status: SHIPPED | OUTPATIENT
Start: 2024-02-26 | End: 2025-02-25

## 2024-02-29 ENCOUNTER — ANTICOAGULATION - WARFARIN VISIT (OUTPATIENT)
Dept: CARDIOLOGY | Facility: CLINIC | Age: 84
End: 2024-02-29
Payer: MEDICARE

## 2024-02-29 DIAGNOSIS — Z79.01 LONG TERM (CURRENT) USE OF ANTICOAGULANTS: ICD-10-CM

## 2024-02-29 DIAGNOSIS — I48.91 ATRIAL FIBRILLATION, UNSPECIFIED TYPE (MULTI): ICD-10-CM

## 2024-02-29 LAB
POC INR: 2.8
POC PROTHROMBIN TIME: NORMAL

## 2024-02-29 PROCEDURE — 99211 OFF/OP EST MAY X REQ PHY/QHP: CPT | Performed by: INTERNAL MEDICINE

## 2024-02-29 PROCEDURE — 85610 PROTHROMBIN TIME: CPT | Mod: QW

## 2024-02-29 NOTE — PROGRESS NOTES
Patient identification verified with 2 identifiers.    Location: [facility] (Building [address]) - [address] [place] [address] [phone]     Referring Physician: Dr. Sae Romero  Enrollment/ Re-enrollment date: 2024   INR Goal: 2.0-3.0  INR monitoring is per [facility] protocol.  Anticoagulation Medication: warfarin  Indication: Atrial Fibrillation/Atrial Flutter    Subjective   Bleeding signs/symptoms: No    Bruising: No   Major bleeding event: No  Thrombosis signs/symptoms: No  Thromboembolic event: No  Missed doses: No  Extra doses: No  Medication changes: No  Dietary changes: No  Change in health: No  Change in activity: No  Alcohol: No  Other concerns: No    Upcoming Surgeries:  Does the Patient Have any upcoming surgeries that require interruption in anticoagulation therapy? no  Does the patient require bridging? no      Anticoagulation Summary  As of 2024      INR goal:  2.0-3.0   TTR:  57.2 % (4.8 mo)   INR used for dosin.80 (2024)   Weekly warfarin total:  21 mg               Assessment/Plan   Therapeutic    1. New dose:  Maintain dose  2. Next INR: 4 weeks      Education provided to patient during the visit:  Patient instructed to call in interim with questions, concerns and changes.   Patient educated on dietary consistency in vitamin k consumption.

## 2024-03-28 ENCOUNTER — ANTICOAGULATION - WARFARIN VISIT (OUTPATIENT)
Dept: CARDIOLOGY | Facility: CLINIC | Age: 84
End: 2024-03-28
Payer: MEDICARE

## 2024-03-28 DIAGNOSIS — Z79.01 LONG TERM (CURRENT) USE OF ANTICOAGULANTS: ICD-10-CM

## 2024-03-28 DIAGNOSIS — I48.91 ATRIAL FIBRILLATION, UNSPECIFIED TYPE (MULTI): ICD-10-CM

## 2024-03-28 LAB
POC INR: 2.3
POC PROTHROMBIN TIME: NORMAL

## 2024-03-28 PROCEDURE — 99211 OFF/OP EST MAY X REQ PHY/QHP: CPT | Performed by: INTERNAL MEDICINE

## 2024-03-28 PROCEDURE — 85610 PROTHROMBIN TIME: CPT | Mod: QW

## 2024-03-28 NOTE — PROGRESS NOTES
Patient identification verified with 2 identifiers.    Location: [facility] (Building [address]) - [address] [address] [place] [address] [phone]     Referring Physician: Dr. Sae Romero  Enrollment/ Re-enrollment date: 2024   INR Goal: 2.0-3.0  INR monitoring is per [facility] protocol.  Anticoagulation Medication: warfarin  Indication: Atrial Fibrillation/Atrial Flutter    Subjective   Bleeding signs/symptoms: No    Bruising: No   Major bleeding event: No  Thrombosis signs/symptoms: No  Thromboembolic event: No  Missed doses: No  Extra doses: No  Medication changes: No  Dietary changes: No  Change in health: No  Change in activity: No  Alcohol: No  Other concerns: No    Upcoming Surgeries:  Does the Patient Have any upcoming surgeries that require interruption in anticoagulation therapy? no  Does the patient require bridging? no      Anticoagulation Summary  As of 3/28/2024      INR goal:  2.0-3.0   TTR:  64.1 % (5.7 mo)   INR used for dosin.30 (3/28/2024)   Weekly warfarin total:  21 mg               Assessment/Plan   Therapeutic    1. New dose:  Maintain dose  2. Next INR: 4 weeks      Education provided to patient during the visit:  Patient instructed to call in interim with questions, concerns and changes.   Patient educated on dietary consistency in vitamin k consumption.

## 2024-04-25 ENCOUNTER — ANTICOAGULATION - WARFARIN VISIT (OUTPATIENT)
Dept: CARDIOLOGY | Facility: CLINIC | Age: 84
End: 2024-04-25
Payer: MEDICARE

## 2024-04-25 DIAGNOSIS — I48.91 ATRIAL FIBRILLATION, UNSPECIFIED TYPE (MULTI): ICD-10-CM

## 2024-04-25 DIAGNOSIS — Z79.01 LONG TERM (CURRENT) USE OF ANTICOAGULANTS: ICD-10-CM

## 2024-04-25 LAB
POC INR: 2.2
POC PROTHROMBIN TIME: NORMAL

## 2024-04-25 PROCEDURE — 85610 PROTHROMBIN TIME: CPT | Mod: QW

## 2024-04-25 PROCEDURE — 99211 OFF/OP EST MAY X REQ PHY/QHP: CPT | Performed by: INTERNAL MEDICINE

## 2024-04-25 NOTE — PROGRESS NOTES
Patient identification verified with 2 identifiers.    Location: [facility] (Building [address]) - [address] [address] [place] [address] [phone]     Referring Physician: Dr. Sae Romero  Enrollment/ Re-enrollment date: 09/2024   INR Goal: 2.0-3.0  INR monitoring is per [facility] protocol.  Anticoagulation Medication: warfarin  Indication: Atrial Fibrillation/Atrial Flutter    Subjective   Bleeding signs/symptoms: No    Bruising: No   Major bleeding event: No  Thrombosis signs/symptoms: No  Thromboembolic event: No  Missed doses: No  Extra doses: No  Medication changes: No  Dietary changes: No  Change in health: No  Change in activity: No  Alcohol: No  Other concerns: No    Upcoming Surgeries:  Does the Patient Have any upcoming surgeries that require interruption in anticoagulation therapy? no  Does the patient require bridging? no      Anticoagulation Summary  As of 4/25/2024      INR goal:  2.0-3.0   TTR:  64.1% (5.7 mo)   INR used for dosing:                 Assessment/Plan   Therapeutic    1. New dose:  Maintain dose  2. Next INR: 4 weeks      Education provided to patient during the visit:  Patient instructed to call in interim with questions, concerns and changes.   Patient educated on dietary consistency in vitamin k consumption.

## 2024-05-17 ENCOUNTER — OFFICE VISIT (OUTPATIENT)
Dept: PRIMARY CARE | Facility: CLINIC | Age: 84
End: 2024-05-17
Payer: MEDICARE

## 2024-05-17 VITALS
BODY MASS INDEX: 19.97 KG/M2 | SYSTOLIC BLOOD PRESSURE: 115 MMHG | TEMPERATURE: 97.7 F | WEIGHT: 120 LBS | DIASTOLIC BLOOD PRESSURE: 77 MMHG | HEART RATE: 64 BPM | OXYGEN SATURATION: 96 %

## 2024-05-17 DIAGNOSIS — M79.10 MYALGIA: ICD-10-CM

## 2024-05-17 DIAGNOSIS — I10 HYPERTENSION, UNSPECIFIED TYPE: Primary | ICD-10-CM

## 2024-05-17 DIAGNOSIS — E78.5 HYPERLIPIDEMIA, UNSPECIFIED HYPERLIPIDEMIA TYPE: ICD-10-CM

## 2024-05-17 DIAGNOSIS — R53.83 OTHER FATIGUE: ICD-10-CM

## 2024-05-17 DIAGNOSIS — G47.33 OSA (OBSTRUCTIVE SLEEP APNEA): ICD-10-CM

## 2024-05-17 PROCEDURE — 3078F DIAST BP <80 MM HG: CPT | Performed by: PEDIATRICS

## 2024-05-17 PROCEDURE — 1125F AMNT PAIN NOTED PAIN PRSNT: CPT | Performed by: PEDIATRICS

## 2024-05-17 PROCEDURE — 1160F RVW MEDS BY RX/DR IN RCRD: CPT | Performed by: PEDIATRICS

## 2024-05-17 PROCEDURE — 1159F MED LIST DOCD IN RCRD: CPT | Performed by: PEDIATRICS

## 2024-05-17 PROCEDURE — 1036F TOBACCO NON-USER: CPT | Performed by: PEDIATRICS

## 2024-05-17 PROCEDURE — 99214 OFFICE O/P EST MOD 30 MIN: CPT | Performed by: PEDIATRICS

## 2024-05-17 PROCEDURE — 3074F SYST BP LT 130 MM HG: CPT | Performed by: PEDIATRICS

## 2024-05-17 ASSESSMENT — PAIN SCALES - GENERAL: PAINLEVEL: 4

## 2024-05-17 ASSESSMENT — ENCOUNTER SYMPTOMS: LEG PAIN: 1

## 2024-05-17 NOTE — PROGRESS NOTES
Subjective   Patient ID: Silas Farley is a 83 y.o. male who presents for Shoulder Pain and Leg Pain.    Shoulder Pain     Leg Pain        Several weeks ago developed pain in upper shoulders and thighs; sometimes rotates to back. No heavy lifting or any other activity. Tylenol helps somewhat. Pain is 4/10 up to 9/10; Going up and down stairs and bending over make the thigh pain worse. Reaching above head hurts the shoulders;   Feels he is weaker also. Used to be able to get up off the floor without pulling himself up but now he has to use his arms and legs to get himself up.  Feels tired; sleeps well at night; Daytime sleepiness is worsening. Does snore at night; wife has not heard him pause in his breathing.  Has not had appetite; Eats 2 meals a day at least;  Fax--[phone]  Review of Systems  Hands ache  Objective   /77   Pulse 64   Temp 36.5 °C (97.7 °F)   Wt 54.4 kg (120 lb)   SpO2 96%   BMI 19.97 kg/m²     Physical Exam  Vitals reviewed.   Constitutional:       General: He is not in acute distress.  HENT:      Head: Normocephalic.      Right Ear: Tympanic membrane normal.      Left Ear: Tympanic membrane normal.      Nose: Nose normal.      Mouth/Throat:      Pharynx: Oropharynx is clear.   Cardiovascular:      Rate and Rhythm: Normal rate and regular rhythm.      Heart sounds: Normal heart sounds.   Pulmonary:      Breath sounds: Normal breath sounds.   Abdominal:      Palpations: Abdomen is soft.      Tenderness: There is no abdominal tenderness.   Musculoskeletal:         General: No tenderness.      Comments: Strength mildly decreased left arm but patient states it has always been  LE normal strength  Arms and thighs not sore currently   Skin:     Findings: No rash.   Neurological:      General: No focal deficit present.      Mental Status: He is alert.   Psychiatric:         Mood and Affect: Mood normal.         Assessment/Plan   Problem List Items Addressed This Visit             ICD-10-CM     Fatigue R53.83    Relevant Orders    Comprehensive Metabolic Panel    CBC    TSH    Hypertension - Primary I10     Well controlled         CHELSEA (obstructive sleep apnea) G47.33    Relevant Orders    Positive Airway Pressure (PAP) Therapy    Myalgia M79.10    Relevant Orders    CK    C-reactive protein    Sedimentation Rate

## 2024-05-23 ENCOUNTER — ANTICOAGULATION - WARFARIN VISIT (OUTPATIENT)
Dept: CARDIOLOGY | Facility: CLINIC | Age: 84
End: 2024-05-23
Payer: MEDICARE

## 2024-05-23 ENCOUNTER — LAB (OUTPATIENT)
Dept: LAB | Facility: LAB | Age: 84
End: 2024-05-23
Payer: MEDICARE

## 2024-05-23 DIAGNOSIS — E78.5 HYPERLIPIDEMIA, UNSPECIFIED HYPERLIPIDEMIA TYPE: ICD-10-CM

## 2024-05-23 DIAGNOSIS — R53.83 OTHER FATIGUE: ICD-10-CM

## 2024-05-23 DIAGNOSIS — Z79.01 LONG TERM (CURRENT) USE OF ANTICOAGULANTS: ICD-10-CM

## 2024-05-23 DIAGNOSIS — I48.91 ATRIAL FIBRILLATION, UNSPECIFIED TYPE (MULTI): ICD-10-CM

## 2024-05-23 DIAGNOSIS — D64.9 ANEMIA, UNSPECIFIED TYPE: ICD-10-CM

## 2024-05-23 DIAGNOSIS — M79.10 MYALGIA: ICD-10-CM

## 2024-05-23 LAB
ALBUMIN SERPL BCP-MCNC: 3.7 G/DL (ref 3.4–5)
ALP SERPL-CCNC: 81 U/L (ref 33–136)
ALT SERPL W P-5'-P-CCNC: 10 U/L (ref 10–52)
ANION GAP SERPL CALC-SCNC: 13 MMOL/L (ref 10–20)
AST SERPL W P-5'-P-CCNC: 13 U/L (ref 9–39)
BILIRUB SERPL-MCNC: 1.3 MG/DL (ref 0–1.2)
BUN SERPL-MCNC: 22 MG/DL (ref 6–23)
CALCIUM SERPL-MCNC: 9.3 MG/DL (ref 8.6–10.3)
CHLORIDE SERPL-SCNC: 104 MMOL/L (ref 98–107)
CHOLEST SERPL-MCNC: 99 MG/DL (ref 0–199)
CHOLESTEROL/HDL RATIO: 2.9
CK SERPL-CCNC: 41 U/L (ref 0–325)
CO2 SERPL-SCNC: 28 MMOL/L (ref 21–32)
CREAT SERPL-MCNC: 1.22 MG/DL (ref 0.5–1.3)
CRP SERPL-MCNC: 0.47 MG/DL
EGFRCR SERPLBLD CKD-EPI 2021: 60 ML/MIN/1.73M*2
ERYTHROCYTE [DISTWIDTH] IN BLOOD BY AUTOMATED COUNT: 15 % (ref 11.5–14.5)
ERYTHROCYTE [SEDIMENTATION RATE] IN BLOOD BY WESTERGREN METHOD: 28 MM/H (ref 0–20)
GLUCOSE SERPL-MCNC: 81 MG/DL (ref 74–99)
HCT VFR BLD AUTO: 40 % (ref 41–52)
HDLC SERPL-MCNC: 34 MG/DL
HGB BLD-MCNC: 12.6 G/DL (ref 13.5–17.5)
LDLC SERPL CALC-MCNC: 51 MG/DL
MCH RBC QN AUTO: 26.3 PG (ref 26–34)
MCHC RBC AUTO-ENTMCNC: 31.5 G/DL (ref 32–36)
MCV RBC AUTO: 83 FL (ref 80–100)
NON HDL CHOLESTEROL: 65 MG/DL (ref 0–149)
NRBC BLD-RTO: 0 /100 WBCS (ref 0–0)
PLATELET # BLD AUTO: 318 X10*3/UL (ref 150–450)
POC INR: 1.6
POC PROTHROMBIN TIME: NORMAL
POTASSIUM SERPL-SCNC: 4.4 MMOL/L (ref 3.5–5.3)
PROT SERPL-MCNC: 6.5 G/DL (ref 6.4–8.2)
RBC # BLD AUTO: 4.8 X10*6/UL (ref 4.5–5.9)
SODIUM SERPL-SCNC: 141 MMOL/L (ref 136–145)
TRIGL SERPL-MCNC: 70 MG/DL (ref 0–149)
TSH SERPL-ACNC: 0.65 MIU/L (ref 0.44–3.98)
VLDL: 14 MG/DL (ref 0–40)
WBC # BLD AUTO: 8.5 X10*3/UL (ref 4.4–11.3)

## 2024-05-23 PROCEDURE — 84443 ASSAY THYROID STIM HORMONE: CPT

## 2024-05-23 PROCEDURE — 80053 COMPREHEN METABOLIC PANEL: CPT

## 2024-05-23 PROCEDURE — 99211 OFF/OP EST MAY X REQ PHY/QHP: CPT | Performed by: INTERNAL MEDICINE

## 2024-05-23 PROCEDURE — 83550 IRON BINDING TEST: CPT

## 2024-05-23 PROCEDURE — 82607 VITAMIN B-12: CPT

## 2024-05-23 PROCEDURE — 85027 COMPLETE CBC AUTOMATED: CPT

## 2024-05-23 PROCEDURE — 36415 COLL VENOUS BLD VENIPUNCTURE: CPT

## 2024-05-23 PROCEDURE — 83540 ASSAY OF IRON: CPT

## 2024-05-23 PROCEDURE — 82550 ASSAY OF CK (CPK): CPT

## 2024-05-23 PROCEDURE — 85652 RBC SED RATE AUTOMATED: CPT

## 2024-05-23 PROCEDURE — 80061 LIPID PANEL: CPT

## 2024-05-23 PROCEDURE — 82728 ASSAY OF FERRITIN: CPT

## 2024-05-23 PROCEDURE — 86140 C-REACTIVE PROTEIN: CPT

## 2024-05-23 NOTE — PROGRESS NOTES
Patient identification verified with 2 identifiers.    Location: [facility] (Building #[address]) [address] [place] [address] [place] [address] [phone]    Referring Physician: Dr. Sae Romero  Enrollment/ Re-enrollment date: 2024   INR Goal: 2.0-3.0  INR monitoring is per [facility] protocol.  Anticoagulation Medication: warfarin  Indication: Atrial Fibrillation/Atrial Flutter    Subjective   Bleeding signs/symptoms: No    Bruising: No   Major bleeding event: No  Thrombosis signs/symptoms: No  Thromboembolic event: No  Missed doses: Yes  Missed dose 2 days ago  Extra doses: No  Medication changes: No  Dietary changes: No  Change in health: No  Change in activity: No  Alcohol: No  Other concerns: No    Upcoming Surgeries:  Does the Patient Have any upcoming surgeries that require interruption in anticoagulation therapy? no  Does the patient require bridging? no      Anticoagulation Summary  As of 2024      INR goal:  2.0-3.0   TTR:  64.8% (7.6 mo)   INR used for dosin.60 (2024)   Weekly warfarin total:  24 mg               Assessment/Plan   SUBTherapeutic    1. New dose:  Increase dose  2. Next INR: 1 week      Education provided to patient during the visit:  Patient instructed to call in interim with questions, concerns and changes.   Patient educated on dietary consistency in vitamin k consumption.

## 2024-05-24 DIAGNOSIS — D64.9 ANEMIA, UNSPECIFIED TYPE: Primary | ICD-10-CM

## 2024-05-24 LAB
FERRITIN SERPL-MCNC: 51 NG/ML (ref 20–300)
IRON SATN MFR SERPL: 20 % (ref 25–45)
IRON SERPL-MCNC: 70 UG/DL (ref 35–150)
TIBC SERPL-MCNC: 346 UG/DL (ref 240–445)
UIBC SERPL-MCNC: 276 UG/DL (ref 110–370)

## 2024-05-25 DIAGNOSIS — D64.9 ANEMIA, UNSPECIFIED TYPE: Primary | ICD-10-CM

## 2024-05-25 LAB — VIT B12 SERPL-MCNC: 301 PG/ML (ref 211–911)

## 2024-05-29 ENCOUNTER — APPOINTMENT (OUTPATIENT)
Dept: PRIMARY CARE | Facility: CLINIC | Age: 84
End: 2024-05-29
Payer: MEDICARE

## 2024-05-30 ENCOUNTER — ANTICOAGULATION - WARFARIN VISIT (OUTPATIENT)
Dept: CARDIOLOGY | Facility: CLINIC | Age: 84
End: 2024-05-30
Payer: MEDICARE

## 2024-05-30 DIAGNOSIS — I48.91 ATRIAL FIBRILLATION, UNSPECIFIED TYPE (MULTI): ICD-10-CM

## 2024-05-30 DIAGNOSIS — Z79.01 LONG TERM (CURRENT) USE OF ANTICOAGULANTS: ICD-10-CM

## 2024-05-30 LAB
POC INR: 3.1
POC PROTHROMBIN TIME: NORMAL

## 2024-05-30 PROCEDURE — 99211 OFF/OP EST MAY X REQ PHY/QHP: CPT | Performed by: INTERNAL MEDICINE

## 2024-05-30 NOTE — PROGRESS NOTES
Patient identification verified with 2 identifiers.    Location: [facility] (Building #[address]) [address] [place] [address] [address], [place] [id number] [phone]    Referring Physician: Dr. Sae Romero  Enrollment/ Re-enrollment date: 09/2024   INR Goal: 2.0-3.0  INR monitoring is per [facility] protocol.  Anticoagulation Medication: warfarin  Indication: Atrial Fibrillation/Atrial Flutter    Subjective   Bleeding signs/symptoms: No    Bruising: No   Major bleeding event: No  Thrombosis signs/symptoms: No  Thromboembolic event: No  Missed doses: No  Extra doses: No  Medication changes: No  Dietary changes: No  Change in health: No  Change in activity: No  Alcohol: No  Other concerns: No    Upcoming Surgeries:  Does the Patient Have any upcoming surgeries that require interruption in anticoagulation therapy? no  Does the patient require bridging? no      Anticoagulation Summary  As of 5/30/2024      INR goal:  2.0-3.0   TTR:  64.8% (7.8 mo)   INR used for dosing:  3.10 (5/30/2024)   Weekly warfarin total:  24 mg               Assessment/Plan   SupraTherapeutic    1. New dose:  Decrease dose to previous therapeutic dose.  2. Next INR: 2 weeks      Education provided to patient during the visit:  Patient instructed to call in interim with questions, concerns and changes.   Patient educated on dietary consistency in vitamin k consumption.

## 2024-06-07 ENCOUNTER — OFFICE VISIT (OUTPATIENT)
Dept: CARDIOLOGY | Facility: CLINIC | Age: 84
End: 2024-06-07
Payer: MEDICARE

## 2024-06-07 VITALS
BODY MASS INDEX: 19.83 KG/M2 | WEIGHT: 119 LBS | HEIGHT: 65 IN | OXYGEN SATURATION: 94 % | SYSTOLIC BLOOD PRESSURE: 130 MMHG | DIASTOLIC BLOOD PRESSURE: 86 MMHG | HEART RATE: 65 BPM

## 2024-06-07 DIAGNOSIS — I25.10 CORONARY ARTERY DISEASE INVOLVING NATIVE CORONARY ARTERY OF NATIVE HEART WITHOUT ANGINA PECTORIS: Primary | ICD-10-CM

## 2024-06-07 PROCEDURE — 93005 ELECTROCARDIOGRAM TRACING: CPT | Performed by: NURSE PRACTITIONER

## 2024-06-07 PROCEDURE — 99214 OFFICE O/P EST MOD 30 MIN: CPT | Performed by: NURSE PRACTITIONER

## 2024-06-07 PROCEDURE — 3075F SYST BP GE 130 - 139MM HG: CPT | Performed by: NURSE PRACTITIONER

## 2024-06-07 PROCEDURE — 3079F DIAST BP 80-89 MM HG: CPT | Performed by: NURSE PRACTITIONER

## 2024-06-07 PROCEDURE — 1159F MED LIST DOCD IN RCRD: CPT | Performed by: NURSE PRACTITIONER

## 2024-06-07 PROCEDURE — 93010 ELECTROCARDIOGRAM REPORT: CPT | Performed by: INTERNAL MEDICINE

## 2024-06-07 RX ORDER — REGADENOSON 0.08 MG/ML
0.4 INJECTION, SOLUTION INTRAVENOUS
OUTPATIENT
Start: 2024-06-07

## 2024-06-07 ASSESSMENT — ENCOUNTER SYMPTOMS
GASTROINTESTINAL NEGATIVE: 1
LOSS OF SENSATION IN FEET: 0
MUSCULOSKELETAL NEGATIVE: 1
DEPRESSION: 0
NEUROLOGICAL NEGATIVE: 1
RESPIRATORY NEGATIVE: 1
CARDIOVASCULAR NEGATIVE: 1
OCCASIONAL FEELINGS OF UNSTEADINESS: 0

## 2024-06-07 ASSESSMENT — LIFESTYLE VARIABLES
AUDIT-C TOTAL SCORE: 0
SKIP TO QUESTIONS 9-10: 1
HOW MANY STANDARD DRINKS CONTAINING ALCOHOL DO YOU HAVE ON A TYPICAL DAY: PATIENT DOES NOT DRINK
HOW OFTEN DO YOU HAVE SIX OR MORE DRINKS ON ONE OCCASION: NEVER
HOW OFTEN DO YOU HAVE A DRINK CONTAINING ALCOHOL: NEVER

## 2024-06-07 NOTE — PROGRESS NOTES
"Chief Complaint:   Follow-up (6 month fuv) and Fatigue    History Of Present Illness:    .Mr Farley returns in follow up.  Denies chest pain, sob, palpitations or pedal edema.  Reports so fatigued, he fell asleep at the top of the stairs and fell down them.  He is being worked up for anemia by pcp.         Last Recorded Vitals:  Blood pressure 130/86, pulse 65, height 1.651 m (5' 5\"), weight 54 kg (119 lb), SpO2 94%.     Past Medical History:  Past Medical History:   Diagnosis Date    Atherosclerotic heart disease of native coronary artery with unspecified angina pectoris (CMS-[facility])     Coronary artery disease involving native heart with angina pectoris, unspecified vessel or lesion type    Personal history of other diseases of the circulatory system     History of hypertension        Past Surgical History:  Past Surgical History:   Procedure Laterality Date    OTHER SURGICAL HISTORY  04/07/2015    Cath Stent 1 Location       Social History:  Social History     Socioeconomic History    Marital status:      Spouse name: Not on file    Number of children: Not on file    Years of education: Not on file    Highest education level: Not on file   Occupational History    Not on file   Tobacco Use    Smoking status: Never    Smokeless tobacco: Never   Substance and Sexual Activity    Alcohol use: Never    Drug use: Never    Sexual activity: Not on file   Other Topics Concern    Not on file   Social History Narrative    Not on file     Social Determinants of Health     Financial Resource Strain: Not on file   Food Insecurity: Not on file   Transportation Needs: Not on file   Physical Activity: Not on file   Stress: Not on file   Social Connections: Not on file   Intimate Partner Violence: Not on file   Housing Stability: Not on file       Family History:  Family History   Problem Relation Name Age of Onset    Hypertension Mother      Aneurysm Father           Allergies:  Patient has no known allergies.    Outpatient " Medications:  Current Outpatient Medications   Medication Sig Dispense Refill    aspirin 81 mg EC tablet Take 1 tablet (81 mg) by mouth once daily. 90 tablet 3    atorvastatin (Lipitor) 80 mg tablet Take 1 tablet (80 mg) by mouth once daily. 90 tablet 3    dorzolamide-timoloL (Cosopt) 22.3-6.8 mg/mL ophthalmic solution Administer 1 drop into affected eye(s) 4 times a day.      ezetimibe (Zetia) 10 mg tablet Take 1 tablet (10 mg) by mouth once daily. 90 tablet 3    famotidine (Pepcid) 40 mg tablet Take 1 tablet (40 mg) by mouth once daily. 90 tablet 1    lisinopril 10 mg tablet Take 1 tablet (10 mg) by mouth once daily. 90 tablet 3    metoprolol succinate XL (Toprol-XL) 25 mg 24 hr tablet Take 1 tablet (25 mg) by mouth once daily. 90 tablet 3    warfarin (Coumadin) 3 mg tablet Take 1 tablet (3 mg) by mouth once daily at bedtime. 100 tablet 3     No current facility-administered medications for this visit.        Physical Exam:  Cardiovascular:      PMI at left midclavicular line. Normal rate. Regular rhythm. Normal S1. Normal S2.       Murmurs: There is no murmur.      No gallop.  No click. No rub.   Pulses:     Intact distal pulses.   Edema:     Peripheral edema absent.         ROS:  Review of Systems   Constitutional: Positive for malaise/fatigue.   Cardiovascular: Negative.    Respiratory: Negative.     Skin: Negative.    Musculoskeletal: Negative.    Gastrointestinal: Negative.    Genitourinary: Negative.    Neurological: Negative.           Last Labs:  CBC -  Lab Results   Component Value Date    WBC 8.5 05/23/2024    HGB 12.6 (L) 05/23/2024    HCT 40.0 (L) 05/23/2024    MCV 83 05/23/2024     05/23/2024       CMP -  Lab Results   Component Value Date    CALCIUM 9.3 05/23/2024    PHOS 3.0 01/23/2020    PROT 6.5 05/23/2024    ALBUMIN 3.7 05/23/2024    AST 13 05/23/2024    ALT 10 05/23/2024    ALKPHOS 81 05/23/2024    BILITOT 1.3 (H) 05/23/2024       LIPID PANEL -   Lab Results   Component Value Date    CHOL  99 05/23/2024    TRIG 70 05/23/2024    HDL 34.0 05/23/2024    CHHDL 2.9 05/23/2024    LDLF 68 04/03/2023    VLDL 14 05/23/2024    NHDL 65 05/23/2024       RENAL FUNCTION PANEL -   Lab Results   Component Value Date    GLUCOSE 81 05/23/2024     05/23/2024    K 4.4 05/23/2024     05/23/2024    CO2 28 05/23/2024    ANIONGAP 13 05/23/2024    BUN 22 05/23/2024    CREATININE 1.22 05/23/2024    GFRMALE 65 04/03/2023    CALCIUM 9.3 05/23/2024    PHOS 3.0 01/23/2020    ALBUMIN 3.7 05/23/2024        Lab Results   Component Value Date    HGBA1C 5.3 11/18/2019         Assessment/Plan   Problem List Items Addressed This Visit          Cardiac and Vasculature    CAD (coronary artery disease) - Primary    Relevant Orders    ECG 12 lead (Clinic Performed)     assessment:     1. Coronary artery disease with percutaneous coronary intervention, and stent to high-grade left anterior descending stenosis December 7, 2004. He did have a stress echo on February 2005, which was negative for ischemia. The patient more recently had a surveillance nuclear exercise treadmill test performed on 6/19/2015. EKG tracings were concerning for the presence of ischemia with downsloping ST segment depression in the inferolateral and anterior leads. The nuclear perfusion scan was surprisingly negative for any evidence of ischemia. Based on EKG findings however cardiac catheterization was advised and performed at [facility] on 6/23/2015. Demonstrated a moderate segmental 50% tubular narrowing of the late proximal LAD with a widely patent stent within the midportion of the LAD. The large essentially dominant LCx was free of obstructive disease. The small to medium-sized RCA exhibited a complete 100% mid vessel occlusion with minimal retrograde filling of the distal vessel from LAD septal perforators. The left ventriculogram showed preserved LV function. The study was reviewed by interventional cardiology and they PCI was not performed  given the absence of an anatomically critical lesion, the absence of any anginal symptoms, and the absence of a perfusion abnormality on the nuclear perfusion scan of the stress test. He will however be monitored closely for disease progression in the LAD that might necessitate another PCI. Patient denies any chest pain at today's office visit. Will obtain stress testing.     Echo 12/2023  CONCLUSIONS:   1. Left ventricular systolic function is normal with a 60-65% estimated ejection fraction.   2. Trace mitral valve regurgitation.   3. Slightly elevated RVSP.   4. Moderate aortic valve stenosis.   5. The aortic valve appears tricuspid with restriction.   6. There is moderate sclerotic calcification and reduced mobility of the right and noncoronary aortic valve cusps.   7. The estimated PASP is 37 mmHg.   8. The peak instantaneous gradient of the aortic valve is 33.3 mmHg.     2. Chronic atrial fibrillation. Patient had a zio patch on 5/20/2016 for one week this showed the predominant rhythm was a sinus rhythm with a heart rate of  bpm with an average heart rate of 70 bpm. He had one episode of ventricular tachycardia lasting five beats. Me was restarted on Metoprolol ER 25 mg daily as well as the digoxin 0.125 mg daily. EKG today to 19 2018 again confirms reversion back to atrial fibrillation which is asymptomatic and chronic. The ventricular rate is adequately controlled with low-dose metoprolol ER 25 mg daily which will be kept unchanged. The patient is asymptomatic without any palpitations although he is unsure as to whether or not he is aware of them even at the time of atrial fibrillation. ECG done today shows sinus with PAC and PVCs.  3. Coumadin anticoagulation. The patient will continue following up with the [facility]. We did discuss alternate anticoagulation at today's office visit.   4. Hyperlipidemia. The patient did have a lipid panel performed on 10/05/2016 including cholesterol 144 LDL 77  HDL 49 triglyceride 86. In order to improve his results she will be switched from simvastatin 80 mg daily to atorvastatin 80 mg daily.  5. Hypertension. Blood pressure is well-controlled at today's office visit. The Metoprolol ER 25 mg daily and Lisinopril 10 mg will be continued.      Edna Araujo, APRN-CNP

## 2024-06-09 LAB
ATRIAL RATE: 71 BPM
P AXIS: 66 DEGREES
P OFFSET: 190 MS
P ONSET: 130 MS
PR INTERVAL: 182 MS
Q ONSET: 221 MS
QRS COUNT: 12 BEATS
QRS DURATION: 86 MS
QT INTERVAL: 394 MS
QTC CALCULATION(BAZETT): 428 MS
QTC FREDERICIA: 416 MS
R AXIS: 53 DEGREES
T AXIS: 71 DEGREES
T OFFSET: 418 MS
VENTRICULAR RATE: 71 BPM

## 2024-06-11 ENCOUNTER — APPOINTMENT (OUTPATIENT)
Dept: PRIMARY CARE | Facility: CLINIC | Age: 84
End: 2024-06-11
Payer: MEDICARE

## 2024-06-20 ENCOUNTER — ANTICOAGULATION - WARFARIN VISIT (OUTPATIENT)
Dept: CARDIOLOGY | Facility: CLINIC | Age: 84
End: 2024-06-20
Payer: MEDICARE

## 2024-06-20 ENCOUNTER — LAB (OUTPATIENT)
Dept: LAB | Facility: LAB | Age: 84
End: 2024-06-20
Payer: MEDICARE

## 2024-06-20 DIAGNOSIS — Z79.01 LONG TERM (CURRENT) USE OF ANTICOAGULANTS: ICD-10-CM

## 2024-06-20 DIAGNOSIS — I48.91 ATRIAL FIBRILLATION, UNSPECIFIED TYPE (MULTI): ICD-10-CM

## 2024-06-20 DIAGNOSIS — M79.10 MYALGIA, UNSPECIFIED SITE: Primary | ICD-10-CM

## 2024-06-20 LAB
ALBUMIN SERPL BCP-MCNC: 3.8 G/DL (ref 3.4–5)
ALP SERPL-CCNC: 89 U/L (ref 33–136)
ALT SERPL W P-5'-P-CCNC: 11 U/L (ref 10–52)
ANION GAP SERPL CALC-SCNC: 14 MMOL/L (ref 10–20)
AST SERPL W P-5'-P-CCNC: 13 U/L (ref 9–39)
BILIRUB SERPL-MCNC: 0.8 MG/DL (ref 0–1.2)
BUN SERPL-MCNC: 24 MG/DL (ref 6–23)
CALCIUM SERPL-MCNC: 9.3 MG/DL (ref 8.6–10.3)
CHLORIDE SERPL-SCNC: 106 MMOL/L (ref 98–107)
CO2 SERPL-SCNC: 25 MMOL/L (ref 21–32)
CREAT SERPL-MCNC: 1.11 MG/DL (ref 0.5–1.3)
EGFRCR SERPLBLD CKD-EPI 2021: 67 ML/MIN/1.73M*2
GLUCOSE SERPL-MCNC: 119 MG/DL (ref 74–99)
POC INR: 3.5
POC PROTHROMBIN TIME: NORMAL
POTASSIUM SERPL-SCNC: 4.5 MMOL/L (ref 3.5–5.3)
PROT SERPL-MCNC: 6.6 G/DL (ref 6.4–8.2)
SODIUM SERPL-SCNC: 140 MMOL/L (ref 136–145)
TSH SERPL-ACNC: 0.55 MIU/L (ref 0.44–3.98)

## 2024-06-20 PROCEDURE — 84443 ASSAY THYROID STIM HORMONE: CPT

## 2024-06-20 PROCEDURE — 86225 DNA ANTIBODY NATIVE: CPT

## 2024-06-20 PROCEDURE — 86038 ANTINUCLEAR ANTIBODIES: CPT

## 2024-06-20 PROCEDURE — 99211 OFF/OP EST MAY X REQ PHY/QHP: CPT | Performed by: INTERNAL MEDICINE

## 2024-06-20 PROCEDURE — 36415 COLL VENOUS BLD VENIPUNCTURE: CPT

## 2024-06-20 PROCEDURE — 81381 HLA I TYPING 1 ALLELE HR: CPT

## 2024-06-20 PROCEDURE — 80053 COMPREHEN METABOLIC PANEL: CPT

## 2024-06-20 PROCEDURE — 85610 PROTHROMBIN TIME: CPT

## 2024-06-20 PROCEDURE — 86431 RHEUMATOID FACTOR QUANT: CPT

## 2024-06-20 PROCEDURE — 87476 LYME DIS DNA AMP PROBE: CPT

## 2024-06-20 NOTE — PROGRESS NOTES
Patient identification verified with 2 identifiers.    Location: [facility] (Building [address]) [address] [place] [address] [place] [address] [phone]    Referring Physician: Dr. Sae Romero  Enrollment/ Re-enrollment date: 09/2024   INR Goal: 2.0-3.0  INR monitoring is per [facility] protocol.  Anticoagulation Medication: warfarin  Indication: Atrial Fibrillation/Atrial Flutter    Subjective   Bleeding signs/symptoms: No    Bruising: No   Major bleeding event: No  Thrombosis signs/symptoms: No  Thromboembolic event: No  Missed doses: No  Extra doses: No  Medication changes: No  Dietary changes: No  Change in health: No  Change in activity: No  Alcohol: No  Other concerns: No    Upcoming Surgeries:  Does the Patient Have any upcoming surgeries that require interruption in anticoagulation therapy? no  Does the patient require bridging? no      Anticoagulation Summary  As of 6/20/2024      INR goal:  2.0-3.0   TTR:  59.5% (8.5 mo)   INR used for dosing:  3.50 (6/20/2024)   Weekly warfarin total:  21 mg               Assessment/Plan   SupraTherapeutic    1. New dose: Hold dose 1 day and decrease  2. Next INR: 1 week      Education provided to patient during the visit:  Patient instructed to call in interim with questions, concerns and changes.   Patient educated on dietary consistency in vitamin k consumption.

## 2024-06-21 LAB
DSDNA AB SER-ACNC: <1 IU/ML
RHEUMATOID FACT SER NEPH-ACNC: <10 IU/ML (ref 0–15)

## 2024-06-24 LAB
ANA PATTERN: ABNORMAL
ANA SER QL HEP2 SUBST: POSITIVE
ANA TITR SER IF: ABNORMAL {TITER}

## 2024-06-25 LAB
B BURGDOR DNA SPEC QL NAA+PROBE: NOT DETECTED
HLAB27 TYPING: NEGATIVE
SPECIMEN SOURCE: NORMAL

## 2024-06-27 ENCOUNTER — TELEPHONE (OUTPATIENT)
Dept: CARDIOLOGY | Facility: CLINIC | Age: 84
End: 2024-06-27

## 2024-07-03 ENCOUNTER — ANTICOAGULATION - WARFARIN VISIT (OUTPATIENT)
Dept: CARDIOLOGY | Facility: CLINIC | Age: 84
End: 2024-07-03
Payer: MEDICARE

## 2024-07-03 DIAGNOSIS — Z79.01 LONG TERM (CURRENT) USE OF ANTICOAGULANTS: ICD-10-CM

## 2024-07-03 DIAGNOSIS — I48.91 ATRIAL FIBRILLATION, UNSPECIFIED TYPE (MULTI): ICD-10-CM

## 2024-07-03 LAB
POC INR: 3.8
POC PROTHROMBIN TIME: NORMAL

## 2024-07-03 PROCEDURE — 85610 PROTHROMBIN TIME: CPT

## 2024-07-03 PROCEDURE — 99211 OFF/OP EST MAY X REQ PHY/QHP: CPT | Performed by: INTERNAL MEDICINE

## 2024-07-03 NOTE — PROGRESS NOTES
Patient identification verified with 2 identifiers.    Location: [facility] (Building #[address]) [address] [place] [address] [place] [place], [place] [id number] [phone]    Referring Physician: Dr. Sae Romero  Enrollment/ Re-enrollment date: 09/2024   INR Goal: 2.0-3.0  INR monitoring is per [facility] protocol.  Anticoagulation Medication: warfarin  Indication: Atrial Fibrillation/Atrial Flutter    Subjective   Bleeding signs/symptoms: No    Bruising: No   Major bleeding event: No  Thrombosis signs/symptoms: No  Thromboembolic event: No  Missed doses: No  Extra doses: No  Medication changes: No  Dietary changes: No  Change in health: No  Change in activity: No  Alcohol: No  Other concerns: No    Upcoming Surgeries:  Does the Patient Have any upcoming surgeries that require interruption in anticoagulation therapy? no  Does the patient require bridging? no      Anticoagulation Summary  As of 7/3/2024      INR goal:  2.0-3.0   TTR:  59.5% (8.5 mo)   INR used for dosing:                 Assessment/Plan   SupraTherapeutic    1. New dose: Hold dose 1 day and decrease  2. Next INR: 1 week      Education provided to patient during the visit:  Patient instructed to call in interim with questions, concerns and changes.   Patient educated on dietary consistency in vitamin k consumption.

## 2024-07-11 ENCOUNTER — ANTICOAGULATION - WARFARIN VISIT (OUTPATIENT)
Dept: CARDIOLOGY | Facility: CLINIC | Age: 84
End: 2024-07-11
Payer: MEDICARE

## 2024-07-11 DIAGNOSIS — I48.91 ATRIAL FIBRILLATION, UNSPECIFIED TYPE (MULTI): ICD-10-CM

## 2024-07-11 DIAGNOSIS — Z79.01 LONG TERM (CURRENT) USE OF ANTICOAGULANTS: ICD-10-CM

## 2024-07-11 LAB
POC INR: 2.5
POC PROTHROMBIN TIME: NORMAL

## 2024-07-11 PROCEDURE — 85610 PROTHROMBIN TIME: CPT | Mod: QW

## 2024-07-11 PROCEDURE — 99211 OFF/OP EST MAY X REQ PHY/QHP: CPT | Performed by: INTERNAL MEDICINE

## 2024-07-11 NOTE — PROGRESS NOTES
Patient identification verified with 2 identifiers.    Location: [facility] (Building #[address]) [address] [place] [address] [place] [place], [place] [id number] [phone]    Referring Physician: Dr. Sae Romero  Enrollment/ Re-enrollment date: 09/2024   INR Goal: 2.0-3.0  INR monitoring is per [facility] protocol.  Anticoagulation Medication: warfarin  Indication: Atrial Fibrillation/Atrial Flutter    Subjective   Bleeding signs/symptoms: No    Bruising: No   Major bleeding event: No  Thrombosis signs/symptoms: No  Thromboembolic event: No  Missed doses: No  Extra doses: No  Medication changes: No  Dietary changes: No  Change in health: No  Change in activity: No  Alcohol: No  Other concerns: No    Upcoming Surgeries:  Does the Patient Have any upcoming surgeries that require interruption in anticoagulation therapy? no  Does the patient require bridging? no      Anticoagulation Summary  As of 7/11/2024      INR goal:  2.0-3.0   TTR:  56.6% (9 mo)   INR used for dosing:  --               Assessment/Plan   Therapeutic    1. New dose: MAINTAIN  2. Next INR: 1 week      Education provided to patient during the visit:  Patient instructed to call in interim with questions, concerns and changes.   Patient educated on dietary consistency in vitamin k consumption.

## 2024-07-19 ENCOUNTER — TELEPHONE (OUTPATIENT)
Dept: PRIMARY CARE | Facility: CLINIC | Age: 84
End: 2024-07-19
Payer: MEDICARE

## 2024-07-19 NOTE — TELEPHONE ENCOUNTER
Cheyenne Urban [facility] left a voicemail message stating that she made seven attempts to reach out to Silas Farley for C-PAP and she left several messages. Cheyenne also stated that she voided the order and will not be making any more attempts this happed before with him back in January or February. If with questions Cheyenne said to call her back at [phone] ext. Thanks

## 2024-07-22 ENCOUNTER — ANTICOAGULATION - WARFARIN VISIT (OUTPATIENT)
Dept: CARDIOLOGY | Facility: CLINIC | Age: 84
End: 2024-07-22
Payer: MEDICARE

## 2024-07-22 DIAGNOSIS — I48.91 ATRIAL FIBRILLATION, UNSPECIFIED TYPE (MULTI): ICD-10-CM

## 2024-07-22 DIAGNOSIS — Z79.01 LONG TERM (CURRENT) USE OF ANTICOAGULANTS: ICD-10-CM

## 2024-07-22 LAB
POC INR: 2
POC PROTHROMBIN TIME: NORMAL

## 2024-07-22 PROCEDURE — 99211 OFF/OP EST MAY X REQ PHY/QHP: CPT | Performed by: INTERNAL MEDICINE

## 2024-07-22 PROCEDURE — 85610 PROTHROMBIN TIME: CPT | Mod: QW

## 2024-07-22 NOTE — PROGRESS NOTES
Patient identification verified with 2 identifiers.    Location: [facility] (Building #[address]) [address] [place] [address] [place] [address] [phone]    Referring Physician: Dr. Sae Romero  Enrollment/ Re-enrollment date: 2024   INR Goal: 2.0-3.0  INR monitoring is per [facility] protocol.  Anticoagulation Medication: warfarin  Indication: Atrial Fibrillation/Atrial Flutter    Subjective   Bleeding signs/symptoms: No    Bruising: No   Major bleeding event: No  Thrombosis signs/symptoms: No  Thromboembolic event: No  Missed doses: No  Extra doses: No  Medication changes: Yes  stopped steroids about 10 days ago  Dietary changes: No  Change in health: No  Change in activity: No  Alcohol: No  Other concerns: No    Upcoming Surgeries:  Does the Patient Have any upcoming surgeries that require interruption in anticoagulation therapy? no  Does the patient require bridging? no      Anticoagulation Summary  As of 2024      INR goal:  2.0-3.0   TTR:  57.8% (9.6 mo)   INR used for dosin.00 (2024)   Weekly warfarin total:  19.5 mg               Assessment/Plan   Therapeutic    1. New dose: MAINTAIN  2. Next INR: 1 week      Education provided to patient during the visit:  Patient instructed to call in interim with questions, concerns and changes.   Patient educated on dietary consistency in vitamin k consumption.

## 2024-08-01 ENCOUNTER — ANTICOAGULATION - WARFARIN VISIT (OUTPATIENT)
Dept: CARDIOLOGY | Facility: CLINIC | Age: 84
End: 2024-08-01
Payer: MEDICARE

## 2024-08-01 DIAGNOSIS — Z79.01 LONG TERM (CURRENT) USE OF ANTICOAGULANTS: ICD-10-CM

## 2024-08-01 DIAGNOSIS — I48.91 ATRIAL FIBRILLATION, UNSPECIFIED TYPE (MULTI): ICD-10-CM

## 2024-08-01 LAB
POC INR: 2.8
POC PROTHROMBIN TIME: NORMAL

## 2024-08-01 PROCEDURE — 85610 PROTHROMBIN TIME: CPT

## 2024-08-01 PROCEDURE — 99211 OFF/OP EST MAY X REQ PHY/QHP: CPT | Performed by: INTERNAL MEDICINE

## 2024-08-01 NOTE — PROGRESS NOTES
Patient identification verified with 2 identifiers.    Location: [facility] (Building #[address]) [address] [place] [address] [place] [address] [phone]    Referring Physician: Dr. Sae Romero  Enrollment/ Re-enrollment date: 2024   INR Goal: 2.0-3.0  INR monitoring is per [facility] protocol.  Anticoagulation Medication: warfarin  Indication: Atrial Fibrillation/Atrial Flutter    Subjective   Bleeding signs/symptoms: No    Bruising: No   Major bleeding event: No  Thrombosis signs/symptoms: No  Thromboembolic event: No  Missed doses: No  Extra doses: No  Medication changes: No  Dietary changes: No  Change in health: No  Change in activity: No  Alcohol: No  Other concerns: No    Upcoming Surgeries:  Does the Patient Have any upcoming surgeries that require interruption in anticoagulation therapy? no  Does the patient require bridging? no      Anticoagulation Summary  As of 2024      INR goal:  2.0-3.0   TTR:  59.2% (9.9 mo)   INR used for dosin.80 (2024)   Weekly warfarin total:  19.5 mg               Assessment/Plan   Therapeutic    1. New dose: MAINTAIN  2. Next INR: 2 weeks  Cannot return in 2 weeks due to other appts       Education provided to patient during the visit:  Patient instructed to call in interim with questions, concerns and changes.   Patient educated on dietary consistency in vitamin k consumption.

## 2024-08-20 NOTE — TELEPHONE ENCOUNTER
Dr. Saldivar, [employer] Pharmacist left a voicemail message saying to call them regarding the Warfarin 3 mg their number is [phone] use reference number [id number].  
Sees nephrology per routine  
None

## 2024-08-22 ENCOUNTER — APPOINTMENT (OUTPATIENT)
Dept: CARDIOLOGY | Facility: CLINIC | Age: 84
End: 2024-08-22
Payer: MEDICARE

## 2024-08-22 ENCOUNTER — ANTICOAGULATION - WARFARIN VISIT (OUTPATIENT)
Dept: CARDIOLOGY | Facility: CLINIC | Age: 84
End: 2024-08-22
Payer: MEDICARE

## 2024-08-22 DIAGNOSIS — I48.91 ATRIAL FIBRILLATION, UNSPECIFIED TYPE (MULTI): Primary | ICD-10-CM

## 2024-08-22 DIAGNOSIS — Z79.01 LONG TERM (CURRENT) USE OF ANTICOAGULANTS: ICD-10-CM

## 2024-08-22 LAB
POC INR: 2.2
POC PROTHROMBIN TIME: NORMAL

## 2024-08-22 PROCEDURE — 99211 OFF/OP EST MAY X REQ PHY/QHP: CPT | Performed by: INTERNAL MEDICINE

## 2024-08-22 PROCEDURE — 85610 PROTHROMBIN TIME: CPT | Mod: QW

## 2024-08-22 NOTE — PROGRESS NOTES
Patient identification verified with 2 identifiers.    Location: [facility] (Building [address]) [address] [place] [address] [place] [place], [place] [id number] [phone]    Referring Physician: Dr. Sae Romero  Enrollment/ Re-enrollment date: 25  INR Goal: 2.0-3.0  INR monitoring is per [facility] protocol.  Anticoagulation Medication: warfarin  Indication: Atrial Fibrillation/Atrial Flutter    Subjective   Bleeding signs/symptoms: No    Bruising: No   Major bleeding event: No  Thrombosis signs/symptoms: No  Thromboembolic event: No  Missed doses: No  Extra doses: No  Medication changes: No  Dietary changes: No  Change in health: No  Change in activity: No  Alcohol: No  Other concerns: No    Upcoming Surgeries:  Does the Patient Have any upcoming surgeries that require interruption in anticoagulation therapy? no  Does the patient require bridging? no      Anticoagulation Summary  As of 2024      INR goal:  2.0-3.0   TTR:  61.9% (10.6 mo)   INR used for dosin.20 (2024)   Weekly warfarin total:  19.5 mg               Assessment/Plan   Therapeutic    1. New dose: MAINTAIN  2. Next INR: 4 weeks      Education provided to patient during the visit:  Patient instructed to call in interim with questions, concerns and changes.   Patient educated on dietary consistency in vitamin k consumption.

## 2024-08-26 PROBLEM — I48.91 ATRIAL FIBRILLATION, UNSPECIFIED TYPE (MULTI): Status: ACTIVE | Noted: 2024-08-26

## 2024-09-19 ENCOUNTER — ANTICOAGULATION - WARFARIN VISIT (OUTPATIENT)
Dept: CARDIOLOGY | Facility: CLINIC | Age: 84
End: 2024-09-19
Payer: MEDICARE

## 2024-09-19 DIAGNOSIS — I48.91 ATRIAL FIBRILLATION, UNSPECIFIED TYPE (MULTI): ICD-10-CM

## 2024-09-19 DIAGNOSIS — Z79.01 LONG TERM (CURRENT) USE OF ANTICOAGULANTS: ICD-10-CM

## 2024-09-19 LAB
POC INR: 3.9
POC PROTHROMBIN TIME: NORMAL

## 2024-09-19 PROCEDURE — 85610 PROTHROMBIN TIME: CPT

## 2024-09-19 PROCEDURE — 99211 OFF/OP EST MAY X REQ PHY/QHP: CPT | Performed by: INTERNAL MEDICINE

## 2024-09-19 NOTE — PROGRESS NOTES
Patient identification verified with 2 identifiers.    Location: [facility] (Building #[address]) [address] [place] [address] [place] [place], [place] [id number] [phone]    Referring Physician: Dr. Sae Romero  Enrollment/ Re-enrollment date: 8/26/25  INR Goal: 2.0-3.0  INR monitoring is per [facility] protocol.  Anticoagulation Medication: warfarin  Indication: Atrial Fibrillation/Atrial Flutter    Subjective   Bleeding signs/symptoms: No    Bruising: No   Major bleeding event: No  Thrombosis signs/symptoms: No  Thromboembolic event: No  Missed doses: No  Extra doses: No  Medication changes: No  Dietary changes: No  Change in health: No  Change in activity: No  Alcohol: No  Other concerns: No    Upcoming Surgeries:  Does the Patient Have any upcoming surgeries that require interruption in anticoagulation therapy? no  Does the patient require bridging? no      Anticoagulation Summary  As of 9/19/2024      INR goal:  2.0-3.0   TTR:  60.7% (11.6 mo)   INR used for dosing:  3.90 (9/19/2024)   Weekly warfarin total:  18 mg               Assessment/Plan   SUPRATherapeutic    1. New dose: Hold 1 dose and decrease dose   2. Next INR: 1 week      Education provided to patient during the visit:  Patient instructed to call in interim with questions, concerns and changes.   Patient educated on dietary consistency in vitamin k consumption.

## 2024-09-26 ENCOUNTER — ANTICOAGULATION - WARFARIN VISIT (OUTPATIENT)
Dept: CARDIOLOGY | Facility: CLINIC | Age: 84
End: 2024-09-26
Payer: MEDICARE

## 2024-09-26 DIAGNOSIS — Z79.01 LONG TERM (CURRENT) USE OF ANTICOAGULANTS: ICD-10-CM

## 2024-09-26 DIAGNOSIS — I48.91 ATRIAL FIBRILLATION, UNSPECIFIED TYPE (MULTI): ICD-10-CM

## 2024-09-26 LAB
POC INR: 1.6
POC PROTHROMBIN TIME: NORMAL

## 2024-09-26 PROCEDURE — 85610 PROTHROMBIN TIME: CPT | Mod: QW

## 2024-09-26 PROCEDURE — 99211 OFF/OP EST MAY X REQ PHY/QHP: CPT | Performed by: INTERNAL MEDICINE

## 2024-09-26 NOTE — PROGRESS NOTES
Patient identification verified with 2 identifiers.    Location: [facility] (Building #2) [address] [place] [address] [address]. [place], [place] [id number] [phone]    Referring Physician: Dr. Sae Romero  Enrollment/ Re-enrollment date: 25  INR Goal: 2.0-3.0  INR monitoring is per [facility] protocol.  Anticoagulation Medication: warfarin  Indication: Atrial Fibrillation/Atrial Flutter    Subjective   Bleeding signs/symptoms: No    Bruising: No   Major bleeding event: No  Thrombosis signs/symptoms: No  Thromboembolic event: No  Missed doses: No  Extra doses: No  Medication changes: No  Dietary changes: No  Change in health: No  Change in activity: No  Alcohol: No  Other concerns: No    Upcoming Surgeries:  Does the Patient Have any upcoming surgeries that require interruption in anticoagulation therapy? no  Does the patient require bridging? no      Anticoagulation Summary  As of 2024      INR goal:  2.0-3.0   TTR:  60.3% (11.8 mo)   INR used for dosin.60 (2024)   Weekly warfarin total:  18 mg               Assessment/Plan   Therapeutic    1. New dose: Increase dose  2. Next INR: 1 week      Education provided to patient during the visit:  Patient instructed to call in interim with questions, concerns and changes.   Patient educated on dietary consistency in vitamin k consumption.

## 2024-10-03 ENCOUNTER — ANTICOAGULATION - WARFARIN VISIT (OUTPATIENT)
Dept: CARDIOLOGY | Facility: CLINIC | Age: 84
End: 2024-10-03
Payer: MEDICARE

## 2024-10-03 DIAGNOSIS — I48.91 ATRIAL FIBRILLATION, UNSPECIFIED TYPE (MULTI): ICD-10-CM

## 2024-10-03 DIAGNOSIS — Z79.01 LONG TERM (CURRENT) USE OF ANTICOAGULANTS: ICD-10-CM

## 2024-10-03 LAB
POC INR: 1.2
POC PROTHROMBIN TIME: NORMAL

## 2024-10-03 PROCEDURE — 99211 OFF/OP EST MAY X REQ PHY/QHP: CPT | Performed by: INTERNAL MEDICINE

## 2024-10-03 PROCEDURE — 85610 PROTHROMBIN TIME: CPT

## 2024-10-03 NOTE — PROGRESS NOTES
Patient identification verified with 2 identifiers.    Location: [facility] (Building #[address]) [address] [place] [address] [address]. [place], [place] [id number] [phone]    Referring Physician: Dr. Sae Romero  Enrollment/ Re-enrollment date: 25  INR Goal: 2.0-3.0  INR monitoring is per [facility] protocol.  Anticoagulation Medication: warfarin  Indication: Atrial Fibrillation/Atrial Flutter    Subjective   Bleeding signs/symptoms: No    Bruising: No   Major bleeding event: No  Thrombosis signs/symptoms: No  Thromboembolic event: No  Missed doses: No  Extra doses: No  Medication changes: No  Dietary changes: No  Change in health: No  Change in activity: No  Alcohol: No  Other concerns: No    Upcoming Surgeries:  Does the Patient Have any upcoming surgeries that require interruption in anticoagulation therapy? no  Does the patient require bridging? no      Anticoagulation Summary  As of 10/3/2024      INR goal:  2.0-3.0   TTR:  59.2% (1 y)   INR used for dosin.20 (10/3/2024)   Weekly warfarin total:  22.5 mg               Assessment/Plan   subTherapeutic    1. New dose: Increase dose  2. Next INR: 1 week      Education provided to patient during the visit:  Patient instructed to call in interim with questions, concerns and changes.   Patient educated on dietary consistency in vitamin k consumption.

## 2024-10-09 ENCOUNTER — APPOINTMENT (OUTPATIENT)
Dept: PRIMARY CARE | Facility: CLINIC | Age: 84
End: 2024-10-09
Payer: MEDICARE

## 2024-10-09 VITALS
OXYGEN SATURATION: 96 % | HEART RATE: 59 BPM | SYSTOLIC BLOOD PRESSURE: 127 MMHG | RESPIRATION RATE: 16 BRPM | WEIGHT: 122 LBS | HEIGHT: 65 IN | DIASTOLIC BLOOD PRESSURE: 81 MMHG | BODY MASS INDEX: 20.33 KG/M2 | TEMPERATURE: 97.5 F

## 2024-10-09 DIAGNOSIS — G47.33 OSA (OBSTRUCTIVE SLEEP APNEA): ICD-10-CM

## 2024-10-09 DIAGNOSIS — M25.541 ARTHRALGIA OF BOTH HANDS: Primary | ICD-10-CM

## 2024-10-09 DIAGNOSIS — I35.0 NONRHEUMATIC AORTIC VALVE STENOSIS: ICD-10-CM

## 2024-10-09 DIAGNOSIS — I10 HYPERTENSION, UNSPECIFIED TYPE: ICD-10-CM

## 2024-10-09 DIAGNOSIS — I48.91 ATRIAL FIBRILLATION, UNSPECIFIED TYPE (MULTI): ICD-10-CM

## 2024-10-09 DIAGNOSIS — M25.542 ARTHRALGIA OF BOTH HANDS: Primary | ICD-10-CM

## 2024-10-09 DIAGNOSIS — E78.5 HYPERLIPIDEMIA, UNSPECIFIED HYPERLIPIDEMIA TYPE: ICD-10-CM

## 2024-10-09 PROBLEM — K21.9 GASTROESOPHAGEAL REFLUX DISEASE: Status: RESOLVED | Noted: 2023-08-21 | Resolved: 2024-10-09

## 2024-10-09 PROCEDURE — 1159F MED LIST DOCD IN RCRD: CPT | Performed by: PEDIATRICS

## 2024-10-09 PROCEDURE — 3074F SYST BP LT 130 MM HG: CPT | Performed by: PEDIATRICS

## 2024-10-09 PROCEDURE — 99213 OFFICE O/P EST LOW 20 MIN: CPT | Performed by: PEDIATRICS

## 2024-10-09 PROCEDURE — 1126F AMNT PAIN NOTED NONE PRSNT: CPT | Performed by: PEDIATRICS

## 2024-10-09 PROCEDURE — 1036F TOBACCO NON-USER: CPT | Performed by: PEDIATRICS

## 2024-10-09 PROCEDURE — G2211 COMPLEX E/M VISIT ADD ON: HCPCS | Performed by: PEDIATRICS

## 2024-10-09 PROCEDURE — 3079F DIAST BP 80-89 MM HG: CPT | Performed by: PEDIATRICS

## 2024-10-09 ASSESSMENT — PATIENT HEALTH QUESTIONNAIRE - PHQ9
2. FEELING DOWN, DEPRESSED OR HOPELESS: NOT AT ALL
1. LITTLE INTEREST OR PLEASURE IN DOING THINGS: NOT AT ALL
SUM OF ALL RESPONSES TO PHQ9 QUESTIONS 1 AND 2: 0

## 2024-10-09 ASSESSMENT — PAIN SCALES - GENERAL: PAINLEVEL: 0-NO PAIN

## 2024-10-09 ASSESSMENT — ENCOUNTER SYMPTOMS: HYPERTENSION: 1

## 2024-10-09 NOTE — PROGRESS NOTES
"Subjective   Patient ID: Silas Farlye is a 84 y.o. male who presents for Hyperlipidemia and Hypertension.    Hyperlipidemia    Hypertension       Pain in shoulders, knees and hips; saw ortho who recommended rheumatologist  Patient does have fatigue. He was diagnosed with moderate CHELSEA in 2020 but never got treated. He is willing to get treated now.  Review of Systems    Objective   /81 (BP Location: Left arm, Patient Position: Sitting, BP Cuff Size: Small adult)   Pulse 59   Temp 36.4 °C (97.5 °F) (Temporal)   Resp 16   Ht 1.651 m (5' 5\")   Wt 55.3 kg (122 lb)   SpO2 96%   BMI 20.30 kg/m²     Physical Exam  Vitals reviewed.   Constitutional:       General: He is not in acute distress.  HENT:      Head: Normocephalic.      Right Ear: Tympanic membrane normal.      Left Ear: Tympanic membrane normal.      Nose: Nose normal.      Mouth/Throat:      Pharynx: Oropharynx is clear.   Cardiovascular:      Rate and Rhythm: Normal rate and regular rhythm.      Heart sounds: Normal heart sounds.   Pulmonary:      Breath sounds: Normal breath sounds.   Abdominal:      Palpations: Abdomen is soft.      Tenderness: There is no abdominal tenderness.   Musculoskeletal:         General: No tenderness.   Skin:     Findings: No rash.   Neurological:      General: No focal deficit present.      Mental Status: He is alert.   Psychiatric:         Mood and Affect: Mood normal.         Assessment/Plan   Problem List Items Addressed This Visit             ICD-10-CM    Hyperlipidemia E78.5    Hypertension I10    CHELSEA (obstructive sleep apnea) G47.33     Moderate CHELSEA in 2020; willing to try CPAP         Relevant Orders    Positive Airway Pressure (PAP) Therapy    Aortic stenosis I35.0     Sees Dr Romero         Atrial fibrillation, unspecified type (Multi) I48.91     Sees Dr Romero          Other Visit Diagnoses         Codes    Arthralgia of both hands    -  Primary M25.541, M25.542    Relevant Orders    Arthritis Panel (CMS)    " XR hand right 1-2 views

## 2024-10-09 NOTE — PATIENT INSTRUCTIONS
Please have [facility] send me their last note and xray reports. Fax to   Get blood work done. Find out the shots [facility] did  Return in 6 months

## 2024-10-10 ENCOUNTER — ANTICOAGULATION - WARFARIN VISIT (OUTPATIENT)
Dept: CARDIOLOGY | Facility: CLINIC | Age: 84
End: 2024-10-10
Payer: MEDICARE

## 2024-10-10 DIAGNOSIS — I48.91 ATRIAL FIBRILLATION, UNSPECIFIED TYPE (MULTI): ICD-10-CM

## 2024-10-10 DIAGNOSIS — Z79.01 LONG TERM (CURRENT) USE OF ANTICOAGULANTS: ICD-10-CM

## 2024-10-10 LAB
POC INR: 2.4
POC PROTHROMBIN TIME: NORMAL

## 2024-10-10 PROCEDURE — 99211 OFF/OP EST MAY X REQ PHY/QHP: CPT | Performed by: INTERNAL MEDICINE

## 2024-10-10 PROCEDURE — 85610 PROTHROMBIN TIME: CPT | Mod: QW

## 2024-10-10 NOTE — PROGRESS NOTES
Patient identification verified with 2 identifiers.    Location: [facility] (Building #[address]) [address] [place] [address] [place] [place], [place] [id number] [phone]    Referring Physician: Dr. Sae Romero  Enrollment/ Re-enrollment date: 25  INR Goal: 2.0-3.0  INR monitoring is per [facility] protocol.  Anticoagulation Medication: warfarin  Indication: Atrial Fibrillation/Atrial Flutter    Subjective   Bleeding signs/symptoms: No    Bruising: No   Major bleeding event: No  Thrombosis signs/symptoms: No  Thromboembolic event: No  Missed doses: No  Extra doses: No  Medication changes: No  Dietary changes: No  Change in health: No  Change in activity: No  Alcohol: No  Other concerns: No    Upcoming Surgeries:  Does the Patient Have any upcoming surgeries that require interruption in anticoagulation therapy? no  Does the patient require bridging? no      Anticoagulation Summary  As of 10/10/2024      INR goal:  2.0-3.0   TTR:  58.7% (1 y)   INR used for dosin.40 (10/10/2024)   Weekly warfarin total:  21 mg               Assessment/Plan   Therapeutic    1. New dose: Maintain dose  2. Next INR: 1 week- cannot return for 2 weeks      Education provided to patient during the visit:  Patient instructed to call in interim with questions, concerns and changes.   Patient educated on dietary consistency in vitamin k consumption.

## 2024-10-21 DIAGNOSIS — K21.9 GASTROESOPHAGEAL REFLUX DISEASE WITHOUT ESOPHAGITIS: ICD-10-CM

## 2024-10-22 RX ORDER — FAMOTIDINE 40 MG/1
40 TABLET, FILM COATED ORAL DAILY
Qty: 90 TABLET | Refills: 3 | Status: SHIPPED | OUTPATIENT
Start: 2024-10-22

## 2024-10-24 ENCOUNTER — ANTICOAGULATION - WARFARIN VISIT (OUTPATIENT)
Dept: CARDIOLOGY | Facility: CLINIC | Age: 84
End: 2024-10-24
Payer: MEDICARE

## 2024-10-24 DIAGNOSIS — Z79.01 LONG TERM (CURRENT) USE OF ANTICOAGULANTS: ICD-10-CM

## 2024-10-24 DIAGNOSIS — I48.91 ATRIAL FIBRILLATION, UNSPECIFIED TYPE (MULTI): ICD-10-CM

## 2024-10-24 LAB
POC INR: 2
POC PROTHROMBIN TIME: NORMAL

## 2024-10-24 PROCEDURE — 99211 OFF/OP EST MAY X REQ PHY/QHP: CPT | Performed by: INTERNAL MEDICINE

## 2024-10-24 PROCEDURE — 85610 PROTHROMBIN TIME: CPT | Mod: QW

## 2024-10-24 NOTE — PROGRESS NOTES
Patient identification verified with 2 identifiers.    Location: [facility] (Building #[address]) [address] [place] [address] [address]. [place], [place] [id number] [phone]    Referring Physician: Dr. Sae Romero  Enrollment/ Re-enrollment date: 25  INR Goal: 2.0-3.0  INR monitoring is per [facility] protocol.  Anticoagulation Medication: warfarin  Indication: Atrial Fibrillation/Atrial Flutter    Subjective   Bleeding signs/symptoms: No    Bruising: No   Major bleeding event: No  Thrombosis signs/symptoms: No  Thromboembolic event: No  Missed doses: No  Extra doses: No  Medication changes: No  Dietary changes: No  Change in health: No  Change in activity: No  Alcohol: No  Other concerns: No    Upcoming Surgeries:  Does the Patient Have any upcoming surgeries that require interruption in anticoagulation therapy? no  Does the patient require bridging? no      Anticoagulation Summary  As of 10/24/2024      INR goal:  2.0-3.0   TTR:  60.2% (1 y)   INR used for dosin.00 (10/24/2024)   Weekly warfarin total:  21 mg               Assessment/Plan   Therapeutic    1. New dose: Maintain dose  2. Next INR: 1 week-       Education provided to patient during the visit:  Patient instructed to call in interim with questions, concerns and changes.   Patient educated on dietary consistency in vitamin k consumption.

## 2024-10-31 ENCOUNTER — ANTICOAGULATION - WARFARIN VISIT (OUTPATIENT)
Dept: CARDIOLOGY | Facility: CLINIC | Age: 84
End: 2024-10-31
Payer: MEDICARE

## 2024-10-31 DIAGNOSIS — Z79.01 LONG TERM (CURRENT) USE OF ANTICOAGULANTS: ICD-10-CM

## 2024-10-31 DIAGNOSIS — I48.91 ATRIAL FIBRILLATION, UNSPECIFIED TYPE (MULTI): ICD-10-CM

## 2024-10-31 LAB
POC INR: 2
POC PROTHROMBIN TIME: NORMAL

## 2024-10-31 PROCEDURE — 99211 OFF/OP EST MAY X REQ PHY/QHP: CPT | Performed by: INTERNAL MEDICINE

## 2024-10-31 PROCEDURE — 85610 PROTHROMBIN TIME: CPT

## 2024-11-09 ENCOUNTER — DOCUMENTATION (OUTPATIENT)
Dept: PHARMACY | Facility: HOSPITAL | Age: 84
End: 2024-11-09
Payer: MEDICARE

## 2024-11-09 NOTE — PROGRESS NOTES
"Based on Silas Farley 's recent medical history, it looks like Silas Farley could be eligible to switch from warfarin to a direct oral anticoagulant (DOAC).    To facilitate this transition smoothly, the [facility] Clinical Pharmacy team is available to assist with the conversion process. The pharmacists can provide support with medication selection, dosing adjustments, financial aid, and patient education to ensure a seamless switch from warfarin to a DOAC.    Warfarin Managing Provider: Dr. Jackie Saldivar MD  Managing Provider Specialty: Primary Care    If the managing provider is interested in pursuing this option, please see instructions below:     Review patient's chart and ensure patient does not have any contraindications to DOACs. The pharmacy team has already done an initial review; however, we encourage a second opinion from the managing provider.   It is strongly recommended that the office staff inform the patient that a referral will be made to the clinical pharmacy team to manage their anticoagulation therapy change. This warm hand off significantly increases the chances of the patient scheduling and working with the pharmacist.   Enter referral to clinical pharmacy as indicated below:   Add Order: \"Referral to Clinical Pharmacy ([id number])  Clinical Pharmacy Service?: Pharmacy Order  Which specialty are you referring to?: Primary Care  Comments: Please indicate reason for referral (warfarin conversion) and any medication preferences (Eliquis versus Xarleto)  The patient will be contacted to set up a telemedicine visit with one of our clinical pharmacists to begin conversion process.   Once the pharmacist meets with the patient, the referring provider will receive ongoing updates.    If the managing provider has any questions, please feel free to reach out.     Jonah Mcbride, PharmD     "

## 2024-11-21 ENCOUNTER — TELEPHONE (OUTPATIENT)
Dept: CARDIOLOGY | Facility: CLINIC | Age: 84
End: 2024-11-21

## 2024-11-21 NOTE — TELEPHONE ENCOUNTER
Called and left voice message for patient to call to reschedule missed appt.   According to chart documented by [facility], patient may be switching to DOAC.

## 2024-12-02 NOTE — TELEPHONE ENCOUNTER
Called and left voice message for patient to call to reschedule missed appt or update AMS if switching to DOAC

## 2024-12-09 ENCOUNTER — ANTICOAGULATION - WARFARIN VISIT (OUTPATIENT)
Dept: CARDIOLOGY | Facility: CLINIC | Age: 84
End: 2024-12-09
Payer: MEDICARE

## 2024-12-09 DIAGNOSIS — I48.91 ATRIAL FIBRILLATION, UNSPECIFIED TYPE (MULTI): ICD-10-CM

## 2024-12-09 DIAGNOSIS — Z79.01 LONG TERM (CURRENT) USE OF ANTICOAGULANTS: ICD-10-CM

## 2024-12-10 ENCOUNTER — ANTICOAGULATION - WARFARIN VISIT (OUTPATIENT)
Dept: CARDIOLOGY | Facility: CLINIC | Age: 84
End: 2024-12-10
Payer: MEDICARE

## 2024-12-10 DIAGNOSIS — Z79.01 LONG TERM (CURRENT) USE OF ANTICOAGULANTS: ICD-10-CM

## 2024-12-10 DIAGNOSIS — I48.91 ATRIAL FIBRILLATION, UNSPECIFIED TYPE (MULTI): ICD-10-CM

## 2024-12-10 LAB
POC INR: 1.8
POC PROTHROMBIN TIME: NORMAL

## 2024-12-10 PROCEDURE — 85610 PROTHROMBIN TIME: CPT

## 2024-12-10 PROCEDURE — 99211 OFF/OP EST MAY X REQ PHY/QHP: CPT | Performed by: INTERNAL MEDICINE

## 2024-12-10 NOTE — PROGRESS NOTES
"Patient identification verified with 2 identifiers.    Location: [facility] (Building #2) [address] [place] [address] [phone]    Referring Physician: Dr. Sae Romero  Enrollment/ Re-enrollment date: 25  INR Goal: 2.0-3.0  INR monitoring is per [facility] protocol.  Anticoagulation Medication: warfarin  Indication: Atrial Fibrillation/Atrial Flutter    Subjective   Bleeding signs/symptoms: No    Bruising: No   Major bleeding event: No  Thrombosis signs/symptoms: No  Thromboembolic event: No  Missed doses: Yes  states he knows that he \"missed a couple\"  Extra doses: No  Medication changes: No  Dietary changes: No  Change in health: No  Change in activity: No  Alcohol: No  Other concerns: No    Upcoming Surgeries:  Does the Patient Have any upcoming surgeries that require interruption in anticoagulation therapy? no  Does the patient require bridging? no      Anticoagulation Summary  As of 12/10/2024      INR goal:  2.0-3.0   TTR:  55.2% (1.2 y)   INR used for dosin.80 (12/10/2024)   Weekly warfarin total:  22.5 mg               Assessment/Plan   Sub Therapeutic    1. New dose: increase  2. Next INR: 1 week      Education provided to patient during the visit:  Patient instructed to call in interim with questions, concerns and changes.   Patient educated on dietary consistency in vitamin k consumption.    "

## 2024-12-13 ENCOUNTER — OFFICE VISIT (OUTPATIENT)
Dept: CARDIOLOGY | Facility: CLINIC | Age: 84
End: 2024-12-13
Payer: MEDICARE

## 2024-12-13 VITALS
DIASTOLIC BLOOD PRESSURE: 80 MMHG | OXYGEN SATURATION: 98 % | WEIGHT: 119 LBS | BODY MASS INDEX: 18.68 KG/M2 | HEART RATE: 58 BPM | HEIGHT: 67 IN | SYSTOLIC BLOOD PRESSURE: 122 MMHG

## 2024-12-13 DIAGNOSIS — I48.91 ATRIAL FIBRILLATION, UNSPECIFIED TYPE (MULTI): ICD-10-CM

## 2024-12-13 DIAGNOSIS — I25.10 CORONARY ARTERY DISEASE INVOLVING NATIVE CORONARY ARTERY OF NATIVE HEART WITHOUT ANGINA PECTORIS: Primary | ICD-10-CM

## 2024-12-13 PROCEDURE — G2211 COMPLEX E/M VISIT ADD ON: HCPCS | Performed by: NURSE PRACTITIONER

## 2024-12-13 PROCEDURE — 99214 OFFICE O/P EST MOD 30 MIN: CPT | Performed by: NURSE PRACTITIONER

## 2024-12-13 PROCEDURE — 1126F AMNT PAIN NOTED NONE PRSNT: CPT | Performed by: NURSE PRACTITIONER

## 2024-12-13 PROCEDURE — 1036F TOBACCO NON-USER: CPT | Performed by: NURSE PRACTITIONER

## 2024-12-13 PROCEDURE — 1159F MED LIST DOCD IN RCRD: CPT | Performed by: NURSE PRACTITIONER

## 2024-12-13 PROCEDURE — 1160F RVW MEDS BY RX/DR IN RCRD: CPT | Performed by: NURSE PRACTITIONER

## 2024-12-13 PROCEDURE — 3074F SYST BP LT 130 MM HG: CPT | Performed by: NURSE PRACTITIONER

## 2024-12-13 PROCEDURE — 3079F DIAST BP 80-89 MM HG: CPT | Performed by: NURSE PRACTITIONER

## 2024-12-13 ASSESSMENT — ENCOUNTER SYMPTOMS
OCCASIONAL FEELINGS OF UNSTEADINESS: 0
GASTROINTESTINAL NEGATIVE: 1
NEUROLOGICAL NEGATIVE: 1
LOSS OF SENSATION IN FEET: 0
CARDIOVASCULAR NEGATIVE: 1
RESPIRATORY NEGATIVE: 1
CONSTITUTIONAL NEGATIVE: 1
DEPRESSION: 0

## 2024-12-13 ASSESSMENT — PAIN SCALES - GENERAL: PAINLEVEL_OUTOF10: 0-NO PAIN

## 2024-12-13 NOTE — PROGRESS NOTES
"Chief Complaint:   Follow-up, Coronary Artery Disease, Hyperlipidemia, and Hypertension (Pt denies c/o chest pain or SOB, says he's been have shoulder and knee discomfort. )    History Of Present Illness:    .Mr Farley returns in follow up.  Denies chest pain, sob, palpitations or pedal edema.  Has arthritis.         Last Recorded Vitals:  Blood pressure 122/80, pulse 58, height 1.702 m (5' 7\"), weight 54 kg (119 lb), SpO2 98%.     Past Medical History:  Past Medical History:   Diagnosis Date    Atherosclerotic heart disease of native coronary artery with unspecified angina pectoris     Coronary artery disease involving native heart with angina pectoris, unspecified vessel or lesion type    Personal history of other diseases of the circulatory system     History of hypertension        Past Surgical History:  Past Surgical History:   Procedure Laterality Date    OTHER SURGICAL HISTORY  04/07/2015    Cath Stent 1 Location       Social History:  Social History     Socioeconomic History    Marital status:    Tobacco Use    Smoking status: Never    Smokeless tobacco: Never   Substance and Sexual Activity    Alcohol use: Never    Drug use: Never       Family History:  Family History   Problem Relation Name Age of Onset    Hypertension Mother      Aneurysm Father           Allergies:  Patient has no known allergies.    Outpatient Medications:  Current Outpatient Medications   Medication Sig Dispense Refill    aspirin 81 mg EC tablet Take 1 tablet (81 mg) by mouth once daily. 90 tablet 3    atorvastatin (Lipitor) 80 mg tablet Take 1 tablet (80 mg) by mouth once daily. 90 tablet 3    dorzolamide-timoloL (Cosopt) 22.3-6.8 mg/mL ophthalmic solution Administer 1 drop into affected eye(s) 4 times a day.      ezetimibe (Zetia) 10 mg tablet Take 1 tablet (10 mg) by mouth once daily. 90 tablet 3    famotidine (Pepcid) 40 mg tablet TAKE 1 TABLET DAILY 90 tablet 3    lisinopril 10 mg tablet Take 1 tablet (10 mg) by mouth once " daily. 90 tablet 3    metoprolol succinate XL (Toprol-XL) 25 mg 24 hr tablet Take 1 tablet (25 mg) by mouth once daily. 90 tablet 3    warfarin (Coumadin) 3 mg tablet Take 1 tablet (3 mg) by mouth once daily at bedtime. 100 tablet 3     No current facility-administered medications for this visit.        Physical Exam:  Cardiovascular:      PMI at left midclavicular line. Normal rate. Regular rhythm. Normal S1. Normal S2.       Murmurs: There is no murmur.      No gallop.  No click. No rub.   Pulses:     Intact distal pulses.   Edema:     Peripheral edema absent.         ROS:  Review of Systems   Constitutional: Negative.   Cardiovascular: Negative.    Respiratory: Negative.     Skin: Negative.    Musculoskeletal:  Positive for joint pain.   Gastrointestinal: Negative.    Genitourinary: Negative.    Neurological: Negative.           Last Labs:  CBC -  Lab Results   Component Value Date    WBC 8.5 05/23/2024    HGB 12.6 (L) 05/23/2024    HCT 40.0 (L) 05/23/2024    MCV 83 05/23/2024     05/23/2024       CMP -  Lab Results   Component Value Date    CALCIUM 9.3 06/20/2024    PHOS 3.0 01/23/2020    PROT 6.6 06/20/2024    ALBUMIN 3.8 06/20/2024    AST 13 06/20/2024    ALT 11 06/20/2024    ALKPHOS 89 06/20/2024    BILITOT 0.8 06/20/2024       LIPID PANEL -   Lab Results   Component Value Date    CHOL 99 05/23/2024    TRIG 70 05/23/2024    HDL 34.0 05/23/2024    CHHDL 2.9 05/23/2024    LDLF 68 04/03/2023    VLDL 14 05/23/2024    NHDL 65 05/23/2024       RENAL FUNCTION PANEL -   Lab Results   Component Value Date    GLUCOSE 119 (H) 06/20/2024     06/20/2024    K 4.5 06/20/2024     06/20/2024    CO2 25 06/20/2024    ANIONGAP 14 06/20/2024    BUN 24 (H) 06/20/2024    CREATININE 1.11 06/20/2024    GFRMALE 65 04/03/2023    CALCIUM 9.3 06/20/2024    PHOS 3.0 01/23/2020    ALBUMIN 3.8 06/20/2024        Lab Results   Component Value Date    HGBA1C 5.3 11/18/2019         Assessment/Plan   Problem List Items  Addressed This Visit    None      1. Coronary artery disease with percutaneous coronary intervention, and stent to high-grade left anterior descending stenosis December 7, 2004. He did have a stress echo on February 2005, which was negative for ischemia. The patient more recently had a surveillance nuclear exercise treadmill test performed on 6/19/2015. EKG tracings were concerning for the presence of ischemia with downsloping ST segment depression in the inferolateral and anterior leads. The nuclear perfusion scan was surprisingly negative for any evidence of ischemia. Based on EKG findings however cardiac catheterization was advised and performed at [facility] on 6/23/2015. Demonstrated a moderate segmental 50% tubular narrowing of the late proximal LAD with a widely patent stent within the midportion of the LAD. The large essentially dominant LCx was free of obstructive disease. The small to medium-sized RCA exhibited a complete 100% mid vessel occlusion with minimal retrograde filling of the distal vessel from LAD septal perforators. The left ventriculogram showed preserved LV function. The study was reviewed by interventional cardiology and they PCI was not performed given the absence of an anatomically critical lesion, the absence of any anginal symptoms, and the absence of a perfusion abnormality on the nuclear perfusion scan of the stress test. He will however be monitored closely for disease progression in the LAD that might necessitate another PCI. Patient denies any chest pain at today's office visit. Will obtain stress testing, which patient has not done.     Echo 12/2023  CONCLUSIONS:   1. Left ventricular systolic function is normal with a 60-65% estimated ejection fraction.   2. Trace mitral valve regurgitation.   3. Slightly elevated RVSP.   4. Moderate aortic valve stenosis.   5. The aortic valve appears tricuspid with restriction.   6. There is moderate sclerotic calcification and reduced  mobility of the right and noncoronary aortic valve cusps.   7. The estimated PASP is 37 mmHg.   8. The peak instantaneous gradient of the aortic valve is 33.3 mmHg.     2. Chronic atrial fibrillation. Patient had a zio patch on 5/20/2016 for one week this showed the predominant rhythm was a sinus rhythm with a heart rate of  bpm with an average heart rate of 70 bpm. He had one episode of ventricular tachycardia lasting five beats. Me was restarted on Metoprolol ER 25 mg daily as well as the digoxin 0.125 mg daily. EKG today to 19 2018 again confirms reversion back to atrial fibrillation which is asymptomatic and chronic. The ventricular rate is adequately controlled with low-dose metoprolol ER 25 mg daily which will be kept unchanged. The patient is asymptomatic without any palpitations although he is unsure as to whether or not he is aware of them even at the time of atrial fibrillation. ECG done today shows sinus with PAC and PVCs.  3. Coumadin anticoagulation. The patient will continue following up with the [facility]. We did discuss alternate anticoagulation at today's office visit.   4. Hyperlipidemia. The patient did have a lipid panel performed on 10/05/2016 including cholesterol 144 LDL 77 HDL 49 triglyceride 86. In order to improve his results she will be switched from simvastatin 80 mg daily to atorvastatin 80 mg daily.  5. Hypertension. Blood pressure is well-controlled at today's office visit. The Metoprolol ER 25 mg daily and Lisinopril 10 mg will be continued.         Edna Araujo, APRN-CNP

## 2024-12-19 ENCOUNTER — ANTICOAGULATION - WARFARIN VISIT (OUTPATIENT)
Dept: CARDIOLOGY | Facility: CLINIC | Age: 84
End: 2024-12-19
Payer: MEDICARE

## 2024-12-19 DIAGNOSIS — Z79.01 LONG TERM (CURRENT) USE OF ANTICOAGULANTS: ICD-10-CM

## 2024-12-19 DIAGNOSIS — I48.91 ATRIAL FIBRILLATION, UNSPECIFIED TYPE (MULTI): ICD-10-CM

## 2024-12-19 LAB
POC INR: 2
POC PROTHROMBIN TIME: NORMAL

## 2024-12-19 PROCEDURE — 85610 PROTHROMBIN TIME: CPT

## 2024-12-19 PROCEDURE — 99211 OFF/OP EST MAY X REQ PHY/QHP: CPT | Performed by: INTERNAL MEDICINE

## 2024-12-19 NOTE — PROGRESS NOTES
Patient identification verified with 2 identifiers.    Location: [facility] (Building #[address]) [address] [place] [address] [address]. [place], [place] [id number] [phone]    Referring Physician: Dr. Sae Romero  Enrollment/ Re-enrollment date: 25  INR Goal: 2.0-3.0  INR monitoring is per [facility] protocol.  Anticoagulation Medication: warfarin  Indication: Atrial Fibrillation/Atrial Flutter    Subjective   Bleeding signs/symptoms: No    Bruising: No   Major bleeding event: No  Thrombosis signs/symptoms: No  Thromboembolic event: No  Missed doses: Yes  missed 1 dose  Extra doses: No  Medication changes: No  Dietary changes: No  Change in health: No  Change in activity: No  Alcohol: No  Other concerns: No    Upcoming Surgeries:  Does the Patient Have any upcoming surgeries that require interruption in anticoagulation therapy? no  Does the patient require bridging? no      Anticoagulation Summary  As of 2024      INR goal:  2.0-3.0   TTR:  54.1% (1.2 y)   INR used for dosin.00 (2024)   Weekly warfarin total:  22.5 mg               Assessment/Plan   Therapeutic    1. New dose: MAINTAIN DOSE  2. Next INR: 1 week- DUE TO HOLIDAY WILL RETURN IN 10 DAYS      Education provided to patient during the visit:  Patient instructed to call in interim with questions, concerns and changes.   Patient educated on dietary consistency in vitamin k consumption.

## 2024-12-30 ENCOUNTER — TELEPHONE (OUTPATIENT)
Dept: CARDIOLOGY | Facility: CLINIC | Age: 84
End: 2024-12-30

## 2024-12-30 NOTE — TELEPHONE ENCOUNTER
Patient missed apt this morning, call placed in attempt to reschedule.  Left message requesting call back to reschedule,

## 2025-01-23 ENCOUNTER — TELEPHONE (OUTPATIENT)
Dept: CARDIOLOGY | Facility: CLINIC | Age: 85
End: 2025-01-23
Payer: MEDICARE

## 2025-01-23 NOTE — TELEPHONE ENCOUNTER
Called and spoke with patient of importance of compliance with appt. Pt. Requests appt. For one week and refused appt offered today

## 2025-01-30 ENCOUNTER — ANTICOAGULATION - WARFARIN VISIT (OUTPATIENT)
Dept: CARDIOLOGY | Facility: CLINIC | Age: 85
End: 2025-01-30
Payer: MEDICARE

## 2025-01-30 DIAGNOSIS — I48.91 ATRIAL FIBRILLATION, UNSPECIFIED TYPE (MULTI): ICD-10-CM

## 2025-01-30 DIAGNOSIS — Z79.01 LONG TERM (CURRENT) USE OF ANTICOAGULANTS: ICD-10-CM

## 2025-01-30 LAB
POC INR: 1.6
POC PROTHROMBIN TIME: NORMAL

## 2025-01-30 PROCEDURE — 99211 OFF/OP EST MAY X REQ PHY/QHP: CPT | Performed by: INTERNAL MEDICINE

## 2025-01-30 PROCEDURE — 85610 PROTHROMBIN TIME: CPT | Mod: QW

## 2025-01-30 NOTE — PROGRESS NOTES
Patient identification verified with 2 identifiers.    Location: [facility] (Building #2) [address] [place] [address] [address]. [place], [place] [id number] [phone]    Referring Physician: Dr. Sae Romero  Enrollment/ Re-enrollment date: 25  INR Goal: 2.0-3.0  INR monitoring is per [facility] protocol.  Anticoagulation Medication: warfarin  Indication: Atrial Fibrillation/Atrial Flutter    Subjective   Bleeding signs/symptoms: No    Bruising: No   Major bleeding event: No  Thrombosis signs/symptoms: No  Thromboembolic event: No  Missed doses: No  Extra doses: No  Medication changes: No  Dietary changes: No  Change in health: No  Change in activity: No  Alcohol: No  Other concerns: No    Upcoming Surgeries:  Does the Patient Have any upcoming surgeries that require interruption in anticoagulation therapy? no  Does the patient require bridging? no      Anticoagulation Summary  As of 2025      INR goal:  2.0-3.0   TTR:  49.4% (1.3 y)   INR used for dosin.60 (2025)   Weekly warfarin total:  22.5 mg               Assessment/Plan   SUPRATherapeutic    1. New dose: INCREASE DOSE  2. Next INR: 1 week       Education provided to patient during the visit:  Patient instructed to call in interim with questions, concerns and changes.   Patient educated on dietary consistency in vitamin k consumption.

## 2025-02-03 ENCOUNTER — APPOINTMENT (OUTPATIENT)
Dept: RHEUMATOLOGY | Facility: CLINIC | Age: 85
End: 2025-02-03
Payer: MEDICARE

## 2025-02-06 ENCOUNTER — TELEPHONE (OUTPATIENT)
Dept: CARDIOLOGY | Facility: CLINIC | Age: 85
End: 2025-02-06

## 2025-02-25 DIAGNOSIS — I48.91 ATRIAL FIBRILLATION, UNSPECIFIED TYPE (MULTI): ICD-10-CM

## 2025-02-26 RX ORDER — WARFARIN 3 MG/1
3 TABLET ORAL NIGHTLY
Qty: 90 TABLET | Refills: 3 | Status: SHIPPED | OUTPATIENT
Start: 2025-02-26

## 2025-02-27 ENCOUNTER — TELEPHONE (OUTPATIENT)
Dept: CARDIOLOGY | Facility: CLINIC | Age: 85
End: 2025-02-27

## 2025-02-27 ENCOUNTER — ANTICOAGULATION - WARFARIN VISIT (OUTPATIENT)
Dept: CARDIOLOGY | Facility: CLINIC | Age: 85
End: 2025-02-27
Payer: MEDICARE

## 2025-02-27 DIAGNOSIS — Z79.01 LONG TERM (CURRENT) USE OF ANTICOAGULANTS: ICD-10-CM

## 2025-02-27 DIAGNOSIS — I48.91 ATRIAL FIBRILLATION, UNSPECIFIED TYPE (MULTI): ICD-10-CM

## 2025-02-27 LAB
POC INR: 2.1
POC PROTHROMBIN TIME: NORMAL

## 2025-02-27 PROCEDURE — 99211 OFF/OP EST MAY X REQ PHY/QHP: CPT | Performed by: INTERNAL MEDICINE

## 2025-02-27 PROCEDURE — 85610 PROTHROMBIN TIME: CPT | Mod: QW

## 2025-02-27 NOTE — PROGRESS NOTES
Patient identification verified with 2 identifiers.    Location: [facility] (Building #2) [address] [place] [address] [address]. [place], [place] [id number] [phone]    Referring Physician: Dr. Sae Romero  Enrollment/ Re-enrollment date: 25  INR Goal: 2.0-3.0  INR monitoring is per [facility] protocol.  Anticoagulation Medication: warfarin  Indication: Atrial Fibrillation/Atrial Flutter    Subjective   Bleeding signs/symptoms: No    Bruising: No   Major bleeding event: No  Thrombosis signs/symptoms: No  Thromboembolic event: No  Missed doses: No  Extra doses: No  Medication changes: No  Dietary changes: No  Change in health: No  Change in activity: No  Alcohol: No  Other concerns: No    Upcoming Surgeries:  Does the Patient Have any upcoming surgeries that require interruption in anticoagulation therapy? no  Does the patient require bridging? no      Anticoagulation Summary  As of 2025      INR goal:  2.0-3.0   TTR:  47.8% (1.4 y)   INR used for dosin.10 (2025)   Weekly warfarin total:  24 mg               Assessment/Plan   Therapeutic    1. New dose: MAINTAIN DOSE  2. Next INR: 4 weeks since it has been 4 weeks since last visit       Education provided to patient during the visit:  Patient instructed to call in interim with questions, concerns and changes.   Patient educated on dietary consistency in vitamin k consumption.

## 2025-03-14 ENCOUNTER — APPOINTMENT (OUTPATIENT)
Dept: RHEUMATOLOGY | Facility: CLINIC | Age: 85
End: 2025-03-14
Payer: MEDICARE

## 2025-04-09 ENCOUNTER — APPOINTMENT (OUTPATIENT)
Dept: PRIMARY CARE | Facility: CLINIC | Age: 85
End: 2025-04-09
Payer: MEDICARE

## 2025-04-09 VITALS
SYSTOLIC BLOOD PRESSURE: 116 MMHG | OXYGEN SATURATION: 95 % | HEART RATE: 60 BPM | TEMPERATURE: 97.8 F | DIASTOLIC BLOOD PRESSURE: 75 MMHG | HEIGHT: 67 IN | WEIGHT: 123 LBS | BODY MASS INDEX: 19.3 KG/M2

## 2025-04-09 DIAGNOSIS — Z00.00 MEDICARE ANNUAL WELLNESS VISIT, SUBSEQUENT: Primary | ICD-10-CM

## 2025-04-09 DIAGNOSIS — E78.5 HYPERLIPIDEMIA, UNSPECIFIED HYPERLIPIDEMIA TYPE: ICD-10-CM

## 2025-04-09 DIAGNOSIS — R21 RASH: ICD-10-CM

## 2025-04-09 DIAGNOSIS — G47.33 OSA (OBSTRUCTIVE SLEEP APNEA): ICD-10-CM

## 2025-04-09 DIAGNOSIS — I48.91 ATRIAL FIBRILLATION, UNSPECIFIED TYPE (MULTI): ICD-10-CM

## 2025-04-09 DIAGNOSIS — E55.9 VITAMIN D DEFICIENCY: ICD-10-CM

## 2025-04-09 DIAGNOSIS — R53.83 OTHER FATIGUE: ICD-10-CM

## 2025-04-09 DIAGNOSIS — I35.0 NONRHEUMATIC AORTIC VALVE STENOSIS: ICD-10-CM

## 2025-04-09 DIAGNOSIS — D64.9 ANEMIA, UNSPECIFIED TYPE: ICD-10-CM

## 2025-04-09 DIAGNOSIS — I10 HYPERTENSION, UNSPECIFIED TYPE: ICD-10-CM

## 2025-04-09 DIAGNOSIS — I25.10 CORONARY ARTERY DISEASE INVOLVING NATIVE CORONARY ARTERY OF NATIVE HEART WITHOUT ANGINA PECTORIS: ICD-10-CM

## 2025-04-09 ASSESSMENT — ENCOUNTER SYMPTOMS
HYPERTENSION: 1
SHORTNESS OF BREATH: 0
COUGH: 0
PALPITATIONS: 0

## 2025-04-09 ASSESSMENT — ACTIVITIES OF DAILY LIVING (ADL)
DOING_HOUSEWORK: INDEPENDENT
MANAGING_FINANCES: NEEDS ASSISTANCE
GROCERY_SHOPPING: INDEPENDENT
TAKING_MEDICATION: INDEPENDENT
BATHING: INDEPENDENT

## 2025-04-09 ASSESSMENT — PATIENT HEALTH QUESTIONNAIRE - PHQ9
SUM OF ALL RESPONSES TO PHQ9 QUESTIONS 1 AND 2: 0
1. LITTLE INTEREST OR PLEASURE IN DOING THINGS: NOT AT ALL
2. FEELING DOWN, DEPRESSED OR HOPELESS: NOT AT ALL

## 2025-04-09 NOTE — PROGRESS NOTES
"Subjective   Patient ID: Silas Farley is a 84 y.o. male who presents for Hyperlipidemia and Hypertension.    Hyperlipidemia  Pertinent negatives include no chest pain or shortness of breath.   Hypertension  Pertinent negatives include no chest pain, palpitations or shortness of breath.      Patient has a dark pigmented rash on both of his calves for the last couple of months. It doesn't itch or burn, no bleeding or discharge. No fevers. Has not applied any topicals. No lower leg swelling.     Review of Systems   Respiratory:  Negative for cough and shortness of breath.    Cardiovascular:  Negative for chest pain, palpitations and leg swelling.   All other systems reviewed and are negative.      Objective   /75 (BP Location: Right arm)   Pulse 60   Temp 36.6 °C (97.8 °F) (Temporal)   Ht 1.702 m (5' 7\")   Wt 55.8 kg (123 lb)   SpO2 95%   BMI 19.26 kg/m²     Physical Exam  Vitals reviewed.   Constitutional:       General: He is not in acute distress.  HENT:      Head: Normocephalic.      Right Ear: Tympanic membrane normal.      Left Ear: Tympanic membrane normal.      Nose: Nose normal.      Mouth/Throat:      Pharynx: Oropharynx is clear.   Cardiovascular:      Rate and Rhythm: Normal rate and regular rhythm.      Heart sounds: Normal heart sounds.   Pulmonary:      Breath sounds: Normal breath sounds.   Abdominal:      Palpations: Abdomen is soft.      Tenderness: There is no abdominal tenderness.   Musculoskeletal:         General: No tenderness.   Skin:     Findings: No rash.      Comments: Red flat rash right inner calf;     Neurological:      General: No focal deficit present.      Mental Status: He is alert.   Psychiatric:         Mood and Affect: Mood normal.         Assessment/Plan   Problem List Items Addressed This Visit             ICD-10-CM    CAD (coronary artery disease) I25.10     No CP         Fatigue R53.83     Has daytime sleepiness         Hyperlipidemia E78.5     On Atorvastatin and " Ezetimibe. Normal lipid panel 10 months ago.          Relevant Orders    Comprehensive Metabolic Panel    Lipid Panel    Hypertension I10     Well controlled. On lisinopril          Vitamin D deficiency E55.9    Relevant Orders    Vitamin D 25-Hydroxy,Total (for eval of Vitamin D levels)    CHELSEA (obstructive sleep apnea) G47.33     Moderate CHELSEA in 2020. Does not have a CPAP         Relevant Orders    Home sleep apnea test (HSAT)    Aortic stenosis I35.0     Will see Dr Romero in June         Anemia D64.9    Relevant Orders    CBC    Iron and TIBC    Ferritin    Vitamin B12    Atrial fibrillation, unspecified type (Multi) I48.91     Sees Dr. Romero         Relevant Orders    Thyroid Stimulating Hormone    Rash R21    Relevant Orders    Referral to Dermatology     Other Visit Diagnoses         Codes    Medicare annual wellness visit, subsequent    -  Primary Z00.00

## 2025-04-18 DIAGNOSIS — E78.5 HYPERLIPIDEMIA: ICD-10-CM

## 2025-04-18 DIAGNOSIS — I10 HYPERTENSION: ICD-10-CM

## 2025-04-21 RX ORDER — LISINOPRIL 10 MG/1
10 TABLET ORAL DAILY
Qty: 90 TABLET | Refills: 1 | Status: SHIPPED | OUTPATIENT
Start: 2025-04-21

## 2025-04-21 RX ORDER — ATORVASTATIN CALCIUM 80 MG/1
80 TABLET, FILM COATED ORAL DAILY
Qty: 90 TABLET | Refills: 1 | Status: SHIPPED | OUTPATIENT
Start: 2025-04-21

## 2025-04-21 RX ORDER — EZETIMIBE 10 MG/1
10 TABLET ORAL DAILY
Qty: 90 TABLET | Refills: 1 | Status: SHIPPED | OUTPATIENT
Start: 2025-04-21

## 2025-04-21 RX ORDER — METOPROLOL SUCCINATE 25 MG/1
25 TABLET, EXTENDED RELEASE ORAL DAILY
Qty: 90 TABLET | Refills: 1 | Status: SHIPPED | OUTPATIENT
Start: 2025-04-21

## 2025-04-22 ENCOUNTER — TELEPHONE (OUTPATIENT)
Dept: CARDIOLOGY | Facility: CLINIC | Age: 85
End: 2025-04-22
Payer: MEDICARE

## 2025-04-22 ENCOUNTER — ANTICOAGULATION - WARFARIN VISIT (OUTPATIENT)
Dept: CARDIOLOGY | Facility: CLINIC | Age: 85
End: 2025-04-22
Payer: MEDICARE

## 2025-04-22 DIAGNOSIS — Z79.01 LONG TERM (CURRENT) USE OF ANTICOAGULANTS: ICD-10-CM

## 2025-04-22 DIAGNOSIS — I48.91 ATRIAL FIBRILLATION, UNSPECIFIED TYPE (MULTI): ICD-10-CM

## 2025-04-22 NOTE — TELEPHONE ENCOUNTER
Call placed to patient in attempt to reschedule missed apt.  Patient is rescheduled this week Thursday per request.

## 2025-04-24 ENCOUNTER — ANTICOAGULATION - WARFARIN VISIT (OUTPATIENT)
Dept: CARDIOLOGY | Facility: CLINIC | Age: 85
End: 2025-04-24
Payer: MEDICARE

## 2025-04-24 ENCOUNTER — APPOINTMENT (OUTPATIENT)
Dept: CARDIOLOGY | Facility: CLINIC | Age: 85
End: 2025-04-24
Payer: MEDICARE

## 2025-04-24 DIAGNOSIS — Z79.01 LONG TERM (CURRENT) USE OF ANTICOAGULANTS: ICD-10-CM

## 2025-04-24 DIAGNOSIS — I48.91 ATRIAL FIBRILLATION, UNSPECIFIED TYPE (MULTI): ICD-10-CM

## 2025-04-24 LAB
POC INR: 2.2 (ref 0.9–1.1)
POC PROTHROMBIN TIME: ABNORMAL (ref 9.3–12.5)

## 2025-04-24 PROCEDURE — 85610 PROTHROMBIN TIME: CPT | Mod: QW

## 2025-04-24 PROCEDURE — 99211 OFF/OP EST MAY X REQ PHY/QHP: CPT | Performed by: INTERNAL MEDICINE

## 2025-04-24 NOTE — PROGRESS NOTES
Patient identification verified with 2 identifiers.    Location: [facility] (Building [address]) [address] [place] [address] [place]. [place], [place] [id number] [phone]    Referring Physician: Dr. Sae Romero  Enrollment/ Re-enrollment date: 25  INR Goal: 2.0-3.0  INR monitoring is per [facility] protocol.  Anticoagulation Medication: warfarin  Indication: Atrial Fibrillation/Atrial Flutter    Subjective   Bleeding signs/symptoms: No    Bruising: No   Major bleeding event: No  Thrombosis signs/symptoms: No  Thromboembolic event: No  Missed doses: No  Extra doses: No  Medication changes: No  Dietary changes: No  Change in health: No  Change in activity: No  Alcohol: No  Other concerns: No    Upcoming Surgeries:  Does the Patient Have any upcoming surgeries that require interruption in anticoagulation therapy? no  Does the patient require bridging? no      Anticoagulation Summary  As of 2025      INR goal:  2.0-3.0   TTR:  52.9% (1.5 y)   INR used for dosin.20 (2025)   Weekly warfarin total:  21 mg               Assessment/Plan   Therapeutic    1. New dose: MAINTAIN DOSE  2. Next INR: 4 weeks       Education provided to patient during the visit:  Patient instructed to call in interim with questions, concerns and changes.   Patient educated on dietary consistency in vitamin k consumption.

## 2025-05-19 ENCOUNTER — PROCEDURE VISIT (OUTPATIENT)
Dept: SLEEP MEDICINE | Facility: HOSPITAL | Age: 85
End: 2025-05-19
Payer: MEDICARE

## 2025-05-19 DIAGNOSIS — G47.33 OSA (OBSTRUCTIVE SLEEP APNEA): ICD-10-CM

## 2025-05-19 PROCEDURE — 95806 SLEEP STUDY UNATT&RESP EFFT: CPT | Performed by: INTERNAL MEDICINE

## 2025-05-22 ENCOUNTER — TELEPHONE (OUTPATIENT)
Dept: CARDIOLOGY | Facility: CLINIC | Age: 85
End: 2025-05-22
Payer: MEDICARE

## 2025-05-29 ENCOUNTER — TELEPHONE (OUTPATIENT)
Dept: PRIMARY CARE | Facility: CLINIC | Age: 85
End: 2025-05-29
Payer: MEDICARE

## 2025-05-29 NOTE — TELEPHONE ENCOUNTER
"----- Message from Jackie Saldivar sent at 5/29/2025  9:11 AM EDT -----  They do recommend CPAP for the sleep apnea. Is he willing to do this or does he want me to refer to adult sleep medicine to discuss alternatives like \"Inspire\" therapy?  ----- Message -----  From: Sujey Moran  Sent: 5/28/2025   2:55 PM EDT  To: Jackie Saldivar MD    "

## 2025-05-29 NOTE — TELEPHONE ENCOUNTER
Unable to reach patient by phone left voice mail message for Patient relaying Dr. Saldivar message and to call  the office back at [phone] to let us know. Pt. Was identified as self on voicemail message.

## 2025-06-01 ASSESSMENT — ENCOUNTER SYMPTOMS
CARDIOVASCULAR NEGATIVE: 1
CONSTITUTIONAL NEGATIVE: 1
GASTROINTESTINAL NEGATIVE: 1
RESPIRATORY NEGATIVE: 1
NEUROLOGICAL NEGATIVE: 1

## 2025-06-01 NOTE — PROGRESS NOTES
Chief Complaint:   No chief complaint on file.    History Of Present Illness:    .Mr Farley returns in follow up.  Denies chest pain, sob, palpitations or pedal edema.  Has arthritis.       Last Recorded Vitals:  There were no vitals taken for this visit.     Past Medical History:  Past Medical History:   Diagnosis Date    Atherosclerotic heart disease of native coronary artery with unspecified angina pectoris     Coronary artery disease involving native heart with angina pectoris, unspecified vessel or lesion type    Personal history of other diseases of the circulatory system     History of hypertension        Past Surgical History:  Past Surgical History:   Procedure Laterality Date    OTHER SURGICAL HISTORY  04/07/2015    Cath Stent 1 Location       Social History:  Social History     Socioeconomic History    Marital status:    Tobacco Use    Smoking status: Never    Smokeless tobacco: Never   Substance and Sexual Activity    Alcohol use: Never    Drug use: Never       Family History:  Family History   Problem Relation Name Age of Onset    Hypertension Mother      Aneurysm Father           Allergies:  Patient has no known allergies.    Outpatient Medications:  Current Outpatient Medications   Medication Sig Dispense Refill    aspirin 81 mg EC tablet Take 1 tablet (81 mg) by mouth once daily. 90 tablet 3    atorvastatin (Lipitor) 80 mg tablet TAKE 1 TABLET DAILY 90 tablet 1    dorzolamide-timoloL (Cosopt) 22.3-6.8 mg/mL ophthalmic solution Administer 1 drop into affected eye(s) 4 times a day.      ezetimibe (Zetia) 10 mg tablet TAKE 1 TABLET DAILY 90 tablet 1    famotidine (Pepcid) 40 mg tablet TAKE 1 TABLET DAILY 90 tablet 3    lisinopril 10 mg tablet TAKE 1 TABLET DAILY 90 tablet 1    metoprolol succinate XL (Toprol-XL) 25 mg 24 hr tablet TAKE 1 TABLET DAILY 90 tablet 1    warfarin (Coumadin) 3 mg tablet TAKE 1 TABLET DAILY AT BEDTIME 90 tablet 3     No current facility-administered medications for this  visit.        Physical Exam:  Cardiovascular:      PMI at left midclavicular line. Normal rate. Regular rhythm. Normal S1. Normal S2.       Murmurs: There is no murmur.      No gallop.  No click. No rub.   Pulses:     Intact distal pulses.   Edema:     Peripheral edema absent.       ROS:  Review of Systems   Constitutional: Negative.   Cardiovascular: Negative.    Respiratory: Negative.     Skin: Negative.    Musculoskeletal:  Positive for joint pain.   Gastrointestinal: Negative.    Genitourinary: Negative.    Neurological: Negative.           Last Labs:  CBC -  Lab Results   Component Value Date    WBC 8.5 05/23/2024    HGB 12.6 (L) 05/23/2024    HCT 40.0 (L) 05/23/2024    MCV 83 05/23/2024     05/23/2024       CMP -  Lab Results   Component Value Date    CALCIUM 9.3 06/20/2024    PHOS 3.0 01/23/2020    PROT 6.6 06/20/2024    ALBUMIN 3.8 06/20/2024    AST 13 06/20/2024    ALT 11 06/20/2024    ALKPHOS 89 06/20/2024    BILITOT 0.8 06/20/2024       LIPID PANEL -   Lab Results   Component Value Date    CHOL 99 05/23/2024    TRIG 70 05/23/2024    HDL 34.0 05/23/2024    CHHDL 2.9 05/23/2024    LDLF 68 04/03/2023    VLDL 14 05/23/2024    NHDL 65 05/23/2024       RENAL FUNCTION PANEL -   Lab Results   Component Value Date    GLUCOSE 119 (H) 06/20/2024     06/20/2024    K 4.5 06/20/2024     06/20/2024    CO2 25 06/20/2024    ANIONGAP 14 06/20/2024    BUN 24 (H) 06/20/2024    CREATININE 1.11 06/20/2024    GFRMALE 65 04/03/2023    CALCIUM 9.3 06/20/2024    PHOS 3.0 01/23/2020    ALBUMIN 3.8 06/20/2024        Lab Results   Component Value Date    HGBA1C 5.3 11/18/2019         Assessment/Plan   Problem List Items Addressed This Visit    None      1. Coronary artery disease with percutaneous coronary intervention, and stent to high-grade left anterior descending stenosis December 7, 2004. He did have a stress echo on February 2005, which was negative for ischemia. The patient more recently had a surveillance  nuclear exercise treadmill test performed on 6/19/2015. EKG tracings were concerning for the presence of ischemia with downsloping ST segment depression in the inferolateral and anterior leads. The nuclear perfusion scan was surprisingly negative for any evidence of ischemia. Based on EKG findings however cardiac catheterization was advised and performed at [facility] on 6/23/2015. Demonstrated a moderate segmental 50% tubular narrowing of the late proximal LAD with a widely patent stent within the midportion of the LAD. The large essentially dominant LCx was free of obstructive disease. The small to medium-sized RCA exhibited a complete 100% mid vessel occlusion with minimal retrograde filling of the distal vessel from LAD septal perforators. The left ventriculogram showed preserved LV function. The study was reviewed by interventional cardiology and they PCI was not performed given the absence of an anatomically critical lesion, the absence of any anginal symptoms, and the absence of a perfusion abnormality on the nuclear perfusion scan of the stress test. He will however be monitored closely for disease progression in the LAD that might necessitate another PCI. Patient denies any chest pain at today's office visit. Will obtain stress testing, which patient has not done.  1A.  Aortic valve stenosis..Echo 12/22/2023  CONCLUSIONS:   1. Left ventricular systolic function is normal with a 60-65% estimated ejection fraction.   2. Trace mitral valve regurgitation.   3. Slightly elevated RVSP.   4. Moderate aortic valve stenosis.   5. The aortic valve appears tricuspid with restriction.   6. There is moderate sclerotic calcification and reduced mobility of the right and noncoronary aortic valve cusps.   7. The estimated PASP is 37 mmHg.   8. The peak instantaneous gradient of the aortic valve is 33.3 mmHg.  Patient will be echocardiogram at time of next office visit 6 months in 12/2025.  Patient's main  complaint is chronic no lightheadedness or dizziness.  He does have lab work as scheduled by primary care including CBC CMP lipid panel vitamin D level serum and TSH.     2. Chronic atrial fibrillation. Patient had a zio patch on 5/20/2016 for one week this showed the predominant rhythm was a sinus rhythm with a heart rate of  bpm with an average heart rate of 70 bpm. He had one episode of ventricular tachycardia lasting five beats. Me was restarted on Metoprolol ER 25 mg daily as well as the digoxin 0.125 mg daily. EKG today to 19 2018 again confirms reversion back to atrial fibrillation which is asymptomatic and chronic. The ventricular rate is adequately controlled with low-dose metoprolol ER 25 mg daily which will be kept unchanged. The patient is asymptomatic without any palpitations although he is unsure as to whether or not he is aware of them even at the time of atrial fibrillation. ECG done today shows sinus with PAC and PVCs.  3. Coumadin anticoagulation. The patient will continue [facility]. We did discuss alternate anticoagulation at today's office visit.   4. Hyperlipidemia. The patient did have a lipid panel performed on 10/05/2016 including cholesterol 144 LDL 77 HDL 49 triglyceride 86. In order to improve his results she will be switched from simvastatin 80 mg daily to atorvastatin 80 mg daily.  5. Hypertension. Blood pressure is well-controlled at today's office visit. The Metoprolol ER 25 mg daily and Lisinopril 10 mg will be continued.  6.  The questionnaire.  The patient did have a home sleep study performed on 5/19/2025 baseline O2 saturation 91.5%.  He only had 10.6 minutes of less than equal to 90% O2 saturation 3.7% of the time and only 7.6 minutes of O2 saturations of less than or equal to 88%.  It appears that he has marginal abnormalities to sleep apnea but will be referred to sleep medicine.

## 2025-06-02 ENCOUNTER — TELEPHONE (OUTPATIENT)
Dept: PRIMARY CARE | Facility: CLINIC | Age: 85
End: 2025-06-02
Payer: MEDICARE

## 2025-06-02 NOTE — TELEPHONE ENCOUNTER
Unable to reach patient by phone left voice mail message for Patient relaying Dr. Saldivar message  and to call [phone] let us know. Pt. was identified as self on voicemail.

## 2025-06-04 ENCOUNTER — TELEPHONE (OUTPATIENT)
Dept: PRIMARY CARE | Facility: CLINIC | Age: 85
End: 2025-06-04
Payer: MEDICARE

## 2025-06-04 NOTE — TELEPHONE ENCOUNTER
Unable to reach patient by phone left voice mail message for Patient to call [phone] to retrieve Dr. Saldivar message.

## 2025-06-06 ENCOUNTER — OFFICE VISIT (OUTPATIENT)
Dept: CARDIOLOGY | Facility: CLINIC | Age: 85
End: 2025-06-06
Payer: MEDICARE

## 2025-06-06 ENCOUNTER — TELEPHONE (OUTPATIENT)
Dept: PRIMARY CARE | Facility: CLINIC | Age: 85
End: 2025-06-06
Payer: MEDICARE

## 2025-06-06 VITALS
WEIGHT: 125.9 LBS | DIASTOLIC BLOOD PRESSURE: 74 MMHG | BODY MASS INDEX: 19.76 KG/M2 | HEART RATE: 57 BPM | SYSTOLIC BLOOD PRESSURE: 118 MMHG | OXYGEN SATURATION: 96 % | HEIGHT: 67 IN

## 2025-06-06 DIAGNOSIS — G47.33 OSA (OBSTRUCTIVE SLEEP APNEA): ICD-10-CM

## 2025-06-06 DIAGNOSIS — I35.0 NONRHEUMATIC AORTIC (VALVE) STENOSIS: Primary | ICD-10-CM

## 2025-06-06 PROCEDURE — 3074F SYST BP LT 130 MM HG: CPT | Performed by: INTERNAL MEDICINE

## 2025-06-06 PROCEDURE — 1160F RVW MEDS BY RX/DR IN RCRD: CPT | Performed by: INTERNAL MEDICINE

## 2025-06-06 PROCEDURE — 3078F DIAST BP <80 MM HG: CPT | Performed by: INTERNAL MEDICINE

## 2025-06-06 PROCEDURE — 99214 OFFICE O/P EST MOD 30 MIN: CPT | Performed by: INTERNAL MEDICINE

## 2025-06-06 PROCEDURE — 99212 OFFICE O/P EST SF 10 MIN: CPT | Performed by: INTERNAL MEDICINE

## 2025-06-06 PROCEDURE — 1159F MED LIST DOCD IN RCRD: CPT | Performed by: INTERNAL MEDICINE

## 2025-06-06 PROCEDURE — 1126F AMNT PAIN NOTED NONE PRSNT: CPT | Performed by: INTERNAL MEDICINE

## 2025-06-06 PROCEDURE — G2211 COMPLEX E/M VISIT ADD ON: HCPCS | Performed by: INTERNAL MEDICINE

## 2025-06-06 ASSESSMENT — PAIN SCALES - GENERAL: PAINLEVEL_OUTOF10: 0-NO PAIN

## 2025-06-06 NOTE — PATIENT INSTRUCTIONS
Continue medications  Lab work ordered by Dr. Saldivar   Referral to sleep medicine   Schedule echo with next visit ([phone])  Follow up in 6 months

## 2025-06-06 NOTE — TELEPHONE ENCOUNTER
I relayed Dr. Saldivar message to Pt. Silas Farley.   Dr. Saldivar Pt. stated that he has an appointment with his cardiologist and would like to discuss it with him because the Inspire is an implant then he will let you know, if  with follow up questions please contact Pt.

## 2025-06-17 ENCOUNTER — TELEPHONE (OUTPATIENT)
Dept: CARDIOLOGY | Facility: CLINIC | Age: 85
End: 2025-06-17
Payer: MEDICARE

## 2025-06-17 ENCOUNTER — ANTICOAGULATION - WARFARIN VISIT (OUTPATIENT)
Dept: CARDIOLOGY | Facility: CLINIC | Age: 85
End: 2025-06-17
Payer: MEDICARE

## 2025-06-17 DIAGNOSIS — I48.91 ATRIAL FIBRILLATION, UNSPECIFIED TYPE (MULTI): ICD-10-CM

## 2025-06-17 DIAGNOSIS — Z79.01 LONG TERM (CURRENT) USE OF ANTICOAGULANTS: ICD-10-CM

## 2025-06-17 NOTE — TELEPHONE ENCOUNTER
Call placed in attempt to get patient rescheduled.  Patient is scheduled per request this week Thursday.

## 2025-06-19 ENCOUNTER — ANTICOAGULATION - WARFARIN VISIT (OUTPATIENT)
Dept: CARDIOLOGY | Facility: CLINIC | Age: 85
End: 2025-06-19
Payer: MEDICARE

## 2025-06-19 DIAGNOSIS — I48.91 ATRIAL FIBRILLATION, UNSPECIFIED TYPE (MULTI): ICD-10-CM

## 2025-06-19 DIAGNOSIS — Z79.01 LONG TERM (CURRENT) USE OF ANTICOAGULANTS: ICD-10-CM

## 2025-06-19 LAB
POC INR: 1.5 (ref 0.9–1.1)
POC PROTHROMBIN TIME: ABNORMAL (ref 9.3–12.5)

## 2025-06-19 PROCEDURE — 85610 PROTHROMBIN TIME: CPT

## 2025-06-19 PROCEDURE — 99211 OFF/OP EST MAY X REQ PHY/QHP: CPT | Performed by: INTERNAL MEDICINE

## 2025-06-19 NOTE — PROGRESS NOTES
Patient identification verified with 2 identifiers.    Location: [facility] (Building [address]) [address] [place] [address] [place]. [place], [place] [id number] [phone]    Referring Physician: Dr. Sae Romero  Enrollment/ Re-enrollment date: 25  INR Goal: 2.0-3.0  INR monitoring is per [facility] protocol.  Anticoagulation Medication: warfarin  Indication: Atrial Fibrillation/Atrial Flutter    Subjective   Bleeding signs/symptoms: No    Bruising: No   Major bleeding event: No  Thrombosis signs/symptoms: No  Thromboembolic event: No  Missed doses: No  Extra doses: No  Medication changes: No  Dietary changes: No  Change in health: No  Change in activity: No  Alcohol: No  Other concerns: No    Upcoming Surgeries:  Does the Patient Have any upcoming surgeries that require interruption in anticoagulation therapy? no  Does the patient require bridging? no      Anticoagulation Summary  As of 2025      INR goal:  2.0-3.0   TTR:  50.7% (1.7 y)   INR used for dosin.50 (2025)   Weekly warfarin total:  24 mg               Assessment/Plan   SUBTherapeutic    1. New dose: INCREASE DOSE  2. Next INR: 1 week -Patient cannot return for 12 days since he will not have a car       Education provided to patient during the visit:  Patient instructed to call in interim with questions, concerns and changes.   Patient educated on dietary consistency in vitamin k consumption.

## 2025-06-20 LAB
25(OH)D3+25(OH)D2 SERPL-MCNC: 36 NG/ML (ref 30–100)
ALBUMIN SERPL-MCNC: 4.2 G/DL (ref 3.6–5.1)
ALP SERPL-CCNC: 84 U/L (ref 35–144)
ALT SERPL-CCNC: 10 U/L (ref 9–46)
ANION GAP SERPL CALCULATED.4IONS-SCNC: 9 MMOL/L (CALC) (ref 7–17)
AST SERPL-CCNC: 12 U/L (ref 10–35)
BILIRUB SERPL-MCNC: 0.7 MG/DL (ref 0.2–1.2)
BUN SERPL-MCNC: 26 MG/DL (ref 7–25)
CALCIUM SERPL-MCNC: 9.2 MG/DL (ref 8.6–10.3)
CHLORIDE SERPL-SCNC: 105 MMOL/L (ref 98–110)
CHOLEST SERPL-MCNC: 122 MG/DL
CHOLEST/HDLC SERPL: 2.7 (CALC)
CO2 SERPL-SCNC: 26 MMOL/L (ref 20–32)
CREAT SERPL-MCNC: 1.13 MG/DL (ref 0.7–1.22)
EGFRCR SERPLBLD CKD-EPI 2021: 65 ML/MIN/1.73M2
ERYTHROCYTE [DISTWIDTH] IN BLOOD BY AUTOMATED COUNT: 14.8 % (ref 11–15)
FERRITIN SERPL-MCNC: 12 NG/ML (ref 24–380)
GLUCOSE SERPL-MCNC: 93 MG/DL (ref 65–99)
HCT VFR BLD AUTO: 39.6 % (ref 38.5–50)
HDLC SERPL-MCNC: 45 MG/DL
HGB BLD-MCNC: 12.3 G/DL (ref 13.2–17.1)
IRON SATN MFR SERPL: 7 % (CALC) (ref 20–48)
IRON SERPL-MCNC: 28 MCG/DL (ref 50–180)
LDLC SERPL CALC-MCNC: 62 MG/DL (CALC)
MCH RBC QN AUTO: 25.2 PG (ref 27–33)
MCHC RBC AUTO-ENTMCNC: 31.1 G/DL (ref 32–36)
MCV RBC AUTO: 81.1 FL (ref 80–100)
NONHDLC SERPL-MCNC: 77 MG/DL (CALC)
PLATELET # BLD AUTO: 303 THOUSAND/UL (ref 140–400)
PMV BLD REES-ECKER: 8.9 FL (ref 7.5–12.5)
POTASSIUM SERPL-SCNC: 4.9 MMOL/L (ref 3.5–5.3)
PROT SERPL-MCNC: 7 G/DL (ref 6.1–8.1)
RBC # BLD AUTO: 4.88 MILLION/UL (ref 4.2–5.8)
SODIUM SERPL-SCNC: 140 MMOL/L (ref 135–146)
TIBC SERPL-MCNC: 424 MCG/DL (CALC) (ref 250–425)
TRIGL SERPL-MCNC: 69 MG/DL
TSH SERPL-ACNC: 0.97 MIU/L (ref 0.4–4.5)
WBC # BLD AUTO: 7.3 THOUSAND/UL (ref 3.8–10.8)

## 2025-06-21 DIAGNOSIS — D50.9 IRON DEFICIENCY ANEMIA, UNSPECIFIED IRON DEFICIENCY ANEMIA TYPE: Primary | ICD-10-CM

## 2025-06-23 ENCOUNTER — TELEPHONE (OUTPATIENT)
Dept: PRIMARY CARE | Facility: CLINIC | Age: 85
End: 2025-06-23
Payer: MEDICARE

## 2025-06-23 NOTE — TELEPHONE ENCOUNTER
----- Message from Jackie Saldivar sent at 6/21/2025 10:02 AM EDT -----  Has iron deficiency anemia which indicates gastrointestinal blood loss. I placed an order for GI referral. Please have [facility] find one near them. Other labs are fine.  ----- Message -----  From: [employer] Results In  Sent: 6/19/2025  11:46 PM EDT  To: Jackie Saldivar MD

## 2025-06-23 NOTE — TELEPHONE ENCOUNTER
I notified Pt. Silas Farley of Dr. Saldivar message. Pt. expressed understanding of the message given.

## 2025-07-01 ENCOUNTER — ANTICOAGULATION - WARFARIN VISIT (OUTPATIENT)
Dept: CARDIOLOGY | Facility: CLINIC | Age: 85
End: 2025-07-01
Payer: MEDICARE

## 2025-07-01 DIAGNOSIS — Z79.01 LONG TERM (CURRENT) USE OF ANTICOAGULANTS: ICD-10-CM

## 2025-07-01 DIAGNOSIS — I48.91 ATRIAL FIBRILLATION, UNSPECIFIED TYPE (MULTI): ICD-10-CM

## 2025-07-01 LAB
POC INR: 2.5 (ref 0.9–1.1)
POC PROTHROMBIN TIME: ABNORMAL (ref 9.3–12.5)

## 2025-07-01 PROCEDURE — 99211 OFF/OP EST MAY X REQ PHY/QHP: CPT | Performed by: INTERNAL MEDICINE

## 2025-07-01 PROCEDURE — 85610 PROTHROMBIN TIME: CPT

## 2025-07-01 NOTE — PROGRESS NOTES
Patient identification verified with 2 identifiers.    Location: [facility] (Building [address]) [address] [place] [address] [place]. [place], [place] [id number] [phone]    Referring Physician: Dr. Sae Romero  Enrollment/ Re-enrollment date: 25  INR Goal: 2.0-3.0  INR monitoring is per [facility] protocol.  Anticoagulation Medication: warfarin  Indication: Atrial Fibrillation/Atrial Flutter    Subjective   Bleeding signs/symptoms: No    Bruising: No   Major bleeding event: No  Thrombosis signs/symptoms: No  Thromboembolic event: No  Missed doses: No  Extra doses: No  Medication changes: No  Dietary changes: No  Change in health: No  Change in activity: No  Alcohol: No  Other concerns: No    Upcoming Surgeries:  Does the Patient Have any upcoming surgeries that require interruption in anticoagulation therapy? no  Does the patient require bridging? no      Anticoagulation Summary  As of 2025      INR goal:  2.0-3.0   TTR:  50.7% (1.7 y)   INR used for dosin.50 (2025)   Weekly warfarin total:  24 mg               Assessment/Plan   Therapeutic    1. New dose: no change  2. Next INR:  2 weeks      Education provided to patient during the visit:  Patient instructed to call in interim with questions, concerns and changes.   Patient educated on dietary consistency in vitamin k consumption.

## 2025-07-30 ENCOUNTER — TELEPHONE (OUTPATIENT)
Dept: CARDIOLOGY | Facility: CLINIC | Age: 85
End: 2025-07-30
Payer: MEDICARE

## 2025-08-07 ENCOUNTER — ANTICOAGULATION - WARFARIN VISIT (OUTPATIENT)
Dept: CARDIOLOGY | Facility: CLINIC | Age: 85
End: 2025-08-07
Payer: MEDICARE

## 2025-08-07 DIAGNOSIS — Z79.01 LONG TERM (CURRENT) USE OF ANTICOAGULANTS: ICD-10-CM

## 2025-08-07 DIAGNOSIS — I48.91 ATRIAL FIBRILLATION, UNSPECIFIED TYPE (MULTI): ICD-10-CM

## 2025-08-13 ENCOUNTER — ANTICOAGULATION - WARFARIN VISIT (OUTPATIENT)
Dept: CARDIOLOGY | Facility: CLINIC | Age: 85
End: 2025-08-13
Payer: MEDICARE

## 2025-08-13 DIAGNOSIS — Z79.01 LONG TERM (CURRENT) USE OF ANTICOAGULANTS: ICD-10-CM

## 2025-08-13 DIAGNOSIS — I48.91 ATRIAL FIBRILLATION, UNSPECIFIED TYPE (MULTI): ICD-10-CM

## 2025-08-13 LAB
POC INR: 2 (ref 0.9–1.1)
POC PROTHROMBIN TIME: ABNORMAL (ref 9.3–12.5)

## 2025-08-13 PROCEDURE — 99211 OFF/OP EST MAY X REQ PHY/QHP: CPT | Performed by: INTERNAL MEDICINE

## 2025-08-13 PROCEDURE — 85610 PROTHROMBIN TIME: CPT | Mod: QW | Performed by: INTERNAL MEDICINE

## 2025-10-13 ENCOUNTER — APPOINTMENT (OUTPATIENT)
Dept: PRIMARY CARE | Facility: CLINIC | Age: 85
End: 2025-10-13
Payer: MEDICARE